# Patient Record
Sex: MALE | Race: BLACK OR AFRICAN AMERICAN | NOT HISPANIC OR LATINO | Employment: FULL TIME | URBAN - METROPOLITAN AREA
[De-identification: names, ages, dates, MRNs, and addresses within clinical notes are randomized per-mention and may not be internally consistent; named-entity substitution may affect disease eponyms.]

---

## 2018-02-01 ENCOUNTER — HISTORICAL (OUTPATIENT)
Dept: CARDIOLOGY | Facility: HOSPITAL | Age: 48
End: 2018-02-01

## 2018-04-19 ENCOUNTER — HISTORICAL (OUTPATIENT)
Dept: ADMINISTRATIVE | Facility: HOSPITAL | Age: 48
End: 2018-04-19

## 2018-04-19 LAB
APPEARANCE, UA: CLEAR
BACTERIA #/AREA URNS AUTO: NORMAL /HPF
BILIRUB UR QL STRIP: NEGATIVE MG/DL
CHOLEST SERPL-MCNC: 166 MG/DL (ref 0–200)
CHOLEST/HDLC SERPL: 2.7 {RATIO}
COLOR UR: YELLOW
GLUCOSE (UA): NEGATIVE MG/DL
HDLC SERPL-MCNC: 62 MG/DL (ref 35–60)
HGB UR QL STRIP: NEGATIVE UNIT/L
KETONES UR QL STRIP: NEGATIVE MG/DL
LDLC SERPL CALC-MCNC: 69 MG/DL (ref 0–129)
LEUKOCYTE ESTERASE UR QL STRIP: NEGATIVE UNIT/L
NITRITE UR QL STRIP.AUTO: NEGATIVE
PH UR STRIP: 7 [PH]
PROT UR QL STRIP: NEGATIVE MG/DL
RBC #/AREA URNS HPF: NORMAL /HPF
SP GR UR STRIP: 1.01
SQUAMOUS EPITHELIAL, UA: NORMAL /LPF
TRIGL SERPL-MCNC: 90 MG/DL (ref 30–150)
UROBILINOGEN UR STRIP-ACNC: 0.2 MG/DL
VLDLC SERPL CALC-MCNC: 18 MG/DL
WBC #/AREA URNS AUTO: NORMAL /[HPF]

## 2018-10-04 ENCOUNTER — HISTORICAL (OUTPATIENT)
Dept: RADIOLOGY | Facility: HOSPITAL | Age: 48
End: 2018-10-04

## 2018-10-04 LAB — POC CREATININE: 1.2 MG/DL (ref 0.6–1.3)

## 2018-10-10 ENCOUNTER — HISTORICAL (OUTPATIENT)
Dept: URGENT CARE | Facility: CLINIC | Age: 48
End: 2018-10-10

## 2018-10-10 LAB
ABS NEUT (OLG): 2.35 X10(3)/MCL (ref 2.1–9.2)
ALBUMIN SERPL-MCNC: 4.3 GM/DL (ref 3.4–5)
ALBUMIN/GLOB SERPL: 1.3 {RATIO}
ALP SERPL-CCNC: 63 UNIT/L (ref 50–136)
ALT SERPL-CCNC: 33 UNIT/L (ref 12–78)
AST SERPL-CCNC: 31 UNIT/L (ref 15–37)
BASOPHILS # BLD AUTO: 0.1 X10(3)/MCL (ref 0–0.2)
BASOPHILS NFR BLD AUTO: 1 %
BILIRUB SERPL-MCNC: 0.6 MG/DL (ref 0.2–1)
BILIRUBIN DIRECT+TOT PNL SERPL-MCNC: 0.2 MG/DL (ref 0–0.2)
BILIRUBIN DIRECT+TOT PNL SERPL-MCNC: 0.4 MG/DL (ref 0–0.8)
BUN SERPL-MCNC: 13 MG/DL (ref 7–18)
CALCIUM SERPL-MCNC: 9.4 MG/DL (ref 8.5–10.1)
CHLORIDE SERPL-SCNC: 107 MMOL/L (ref 98–107)
CO2 SERPL-SCNC: 28 MMOL/L (ref 21–32)
CREAT SERPL-MCNC: 1.11 MG/DL (ref 0.7–1.3)
EOSINOPHIL # BLD AUTO: 0.2 X10(3)/MCL (ref 0–0.9)
EOSINOPHIL NFR BLD AUTO: 3 %
ERYTHROCYTE [DISTWIDTH] IN BLOOD BY AUTOMATED COUNT: 13.1 % (ref 11.5–17)
GLOBULIN SER-MCNC: 3.2 GM/DL (ref 2.4–3.5)
GLUCOSE SERPL-MCNC: 86 MG/DL (ref 74–106)
HCT VFR BLD AUTO: 40 % (ref 42–52)
HGB BLD-MCNC: 13.4 GM/DL (ref 14–18)
LYMPHOCYTES # BLD AUTO: 3.8 X10(3)/MCL (ref 0.6–4.6)
LYMPHOCYTES NFR BLD AUTO: 52 %
MCH RBC QN AUTO: 31 PG (ref 27–31)
MCHC RBC AUTO-ENTMCNC: 33.5 GM/DL (ref 33–36)
MCV RBC AUTO: 92.6 FL (ref 80–94)
MONOCYTES # BLD AUTO: 0.9 X10(3)/MCL (ref 0.1–1.3)
MONOCYTES NFR BLD AUTO: 12 %
NEUTROPHILS # BLD AUTO: 2.35 X10(3)/MCL (ref 2.1–9.2)
NEUTROPHILS NFR BLD AUTO: 32 %
PLATELET # BLD AUTO: 245 X10(3)/MCL (ref 130–400)
PMV BLD AUTO: 10.6 FL (ref 9.4–12.4)
POTASSIUM SERPL-SCNC: 4.4 MMOL/L (ref 3.5–5.1)
PROT SERPL-MCNC: 7.5 GM/DL (ref 6.4–8.2)
RBC # BLD AUTO: 4.32 X10(6)/MCL (ref 4.7–6.1)
SODIUM SERPL-SCNC: 143 MMOL/L (ref 136–145)
WBC # SPEC AUTO: 7.4 X10(3)/MCL (ref 4.5–11.5)

## 2019-02-18 ENCOUNTER — HISTORICAL (OUTPATIENT)
Dept: ADMINISTRATIVE | Facility: HOSPITAL | Age: 49
End: 2019-02-18

## 2019-02-18 LAB
ABS NEUT (OLG): 2 X10(3)/MCL (ref 2.1–9.2)
ALBUMIN SERPL-MCNC: 4.4 GM/DL (ref 3.4–5)
ALBUMIN/GLOB SERPL: 1.83 {RATIO} (ref 1.5–2.5)
ALP SERPL-CCNC: 52 UNIT/L (ref 38–126)
ALT SERPL-CCNC: 22 UNIT/L (ref 7–52)
APPEARANCE, UA: CLEAR
AST SERPL-CCNC: 25 UNIT/L (ref 15–37)
BACTERIA #/AREA URNS AUTO: NORMAL /HPF
BILIRUB SERPL-MCNC: 0.6 MG/DL (ref 0.2–1)
BILIRUB UR QL STRIP: NEGATIVE MG/DL
BILIRUBIN DIRECT+TOT PNL SERPL-MCNC: 0.1 MG/DL (ref 0–0.5)
BILIRUBIN DIRECT+TOT PNL SERPL-MCNC: 0.5 MG/DL
BUN SERPL-MCNC: 13 MG/DL (ref 7–18)
CALCIUM SERPL-MCNC: 9.4 MG/DL (ref 8.5–10)
CHLORIDE SERPL-SCNC: 101 MMOL/L (ref 98–107)
CHOLEST SERPL-MCNC: 249 MG/DL (ref 0–200)
CHOLEST/HDLC SERPL: 4.6 {RATIO}
CO2 SERPL-SCNC: 27 MMOL/L (ref 21–32)
COLOR UR: YELLOW
CREAT SERPL-MCNC: 1.17 MG/DL (ref 0.6–1.3)
ERYTHROCYTE [DISTWIDTH] IN BLOOD BY AUTOMATED COUNT: 13.4 % (ref 11.5–17)
GLOBULIN SER-MCNC: 2.4 GM/DL (ref 1.2–3)
GLUCOSE (UA): NEGATIVE MG/DL
GLUCOSE SERPL-MCNC: 92 MG/DL (ref 74–106)
HCT VFR BLD AUTO: 43 % (ref 42–52)
HDLC SERPL-MCNC: 54 MG/DL (ref 35–60)
HGB BLD-MCNC: 13.7 GM/DL (ref 14–18)
HGB UR QL STRIP: NEGATIVE UNIT/L
KETONES UR QL STRIP: NEGATIVE MG/DL
LDLC SERPL CALC-MCNC: 127 MG/DL (ref 0–129)
LEUKOCYTE ESTERASE UR QL STRIP: NEGATIVE UNIT/L
LYMPHOCYTES # BLD AUTO: 4.2 X10(3)/MCL (ref 0.6–3.4)
LYMPHOCYTES NFR BLD AUTO: 58.7 % (ref 13–40)
MCH RBC QN AUTO: 30.7 PG (ref 27–31.2)
MCHC RBC AUTO-ENTMCNC: 32 GM/DL (ref 32–36)
MCV RBC AUTO: 96 FL (ref 80–94)
MONOCYTES # BLD AUTO: 1 X10(3)/MCL (ref 0.1–1.3)
MONOCYTES NFR BLD AUTO: 14.3 % (ref 0.1–24)
NEUTROPHILS NFR BLD AUTO: 27 % (ref 47–80)
NITRITE UR QL STRIP.AUTO: NEGATIVE
PH UR STRIP: 7 [PH]
PLATELET # BLD AUTO: 236 X10(3)/MCL (ref 130–400)
PMV BLD AUTO: 9.5 FL (ref 9.4–12.4)
POTASSIUM SERPL-SCNC: 4.5 MMOL/L (ref 3.5–5.1)
PROT SERPL-MCNC: 6.8 GM/DL (ref 6.4–8.2)
PROT UR QL STRIP: NEGATIVE MG/DL
PSA SERPL-MCNC: 0.66 NG/ML (ref 0–2.5)
RBC # BLD AUTO: 4.46 X10(6)/MCL (ref 4.7–6.1)
RBC #/AREA URNS HPF: NORMAL /HPF
SODIUM SERPL-SCNC: 137 MMOL/L (ref 136–145)
SP GR UR STRIP: 1.01
SQUAMOUS EPITHELIAL, UA: NORMAL /LPF
TRIGL SERPL-MCNC: 169 MG/DL (ref 30–150)
UROBILINOGEN UR STRIP-ACNC: 0.2 MG/DL
VLDLC SERPL CALC-MCNC: 33.8 MG/DL
WBC # SPEC AUTO: 7.2 X10(3)/MCL (ref 4.5–11.5)
WBC #/AREA URNS AUTO: NORMAL /[HPF]

## 2019-03-21 ENCOUNTER — HISTORICAL (OUTPATIENT)
Dept: RADIOLOGY | Facility: HOSPITAL | Age: 49
End: 2019-03-21

## 2019-12-03 ENCOUNTER — HISTORICAL (OUTPATIENT)
Dept: ADMINISTRATIVE | Facility: HOSPITAL | Age: 49
End: 2019-12-03

## 2019-12-03 LAB
ABS NEUT (OLG): 2.68 X10(3)/MCL (ref 2.1–9.2)
EOSINOPHIL NFR BLD MANUAL: 8 % (ref 0–8)
ERYTHROCYTE [DISTWIDTH] IN BLOOD BY AUTOMATED COUNT: 13.9 % (ref 11.5–17)
HCT VFR BLD AUTO: 39.7 % (ref 42–52)
HGB BLD-MCNC: 12.8 GM/DL (ref 14–18)
LYMPHOCYTES NFR BLD MANUAL: 38 % (ref 13–40)
MCH RBC QN AUTO: 30.2 PG (ref 27–31)
MCHC RBC AUTO-ENTMCNC: 32.2 GM/DL (ref 33–36)
MCV RBC AUTO: 93.6 FL (ref 80–94)
MONOCYTES NFR BLD MANUAL: 11 % (ref 2–11)
NEUTROPHILS NFR BLD MANUAL: 43 % (ref 47–80)
PLATELET # BLD AUTO: 219 X10(3)/MCL (ref 130–400)
PLATELET # BLD EST: NORMAL 10*3/UL
PMV BLD AUTO: 10.5 FL (ref 9.4–12.4)
RBC # BLD AUTO: 4.24 X10(6)/MCL (ref 4.7–6.1)
WBC # SPEC AUTO: 5.8 X10(3)/MCL (ref 4.5–11.5)

## 2019-12-16 LAB — CRC RECOMMENDATION EXT: NORMAL

## 2020-03-11 ENCOUNTER — HISTORICAL (OUTPATIENT)
Dept: ADMINISTRATIVE | Facility: HOSPITAL | Age: 50
End: 2020-03-11

## 2020-03-11 LAB
ABS NEUT (OLG): 1.4 X10(3)/MCL (ref 2.1–9.2)
ALBUMIN SERPL-MCNC: 4.7 GM/DL (ref 3.4–5)
ALBUMIN/GLOB SERPL: 1.88 {RATIO} (ref 1.5–2.5)
ALP SERPL-CCNC: 59 UNIT/L (ref 38–126)
ALT SERPL-CCNC: 27 UNIT/L (ref 7–52)
APPEARANCE, UA: CLEAR
AST SERPL-CCNC: 25 UNIT/L (ref 15–37)
BACTERIA #/AREA URNS AUTO: NORMAL /HPF
BILIRUB SERPL-MCNC: 0.5 MG/DL (ref 0.2–1)
BILIRUB UR QL STRIP: NEGATIVE MG/DL
BILIRUBIN DIRECT+TOT PNL SERPL-MCNC: 0.1 MG/DL (ref 0–0.5)
BILIRUBIN DIRECT+TOT PNL SERPL-MCNC: 0.4 MG/DL
BUN SERPL-MCNC: 17 MG/DL (ref 7–18)
CALCIUM SERPL-MCNC: 9.7 MG/DL (ref 8.5–10)
CHLORIDE SERPL-SCNC: 101 MMOL/L (ref 98–107)
CHOLEST SERPL-MCNC: 219 MG/DL (ref 0–200)
CHOLEST/HDLC SERPL: 3.9 {RATIO}
CO2 SERPL-SCNC: 32 MMOL/L (ref 21–32)
COLOR UR: YELLOW
CREAT SERPL-MCNC: 1.12 MG/DL (ref 0.6–1.3)
ERYTHROCYTE [DISTWIDTH] IN BLOOD BY AUTOMATED COUNT: 12.9 % (ref 11.5–17)
GLOBULIN SER-MCNC: 2.5 GM/DL (ref 1.2–3)
GLUCOSE (UA): NEGATIVE MG/DL
GLUCOSE SERPL-MCNC: 99 MG/DL (ref 74–106)
HCT VFR BLD AUTO: 43.1 % (ref 42–52)
HDLC SERPL-MCNC: 56 MG/DL (ref 35–60)
HGB BLD-MCNC: 14.2 GM/DL (ref 14–18)
HGB UR QL STRIP: NEGATIVE UNIT/L
KETONES UR QL STRIP: NEGATIVE MG/DL
LDLC SERPL CALC-MCNC: 141 MG/DL (ref 0–129)
LEUKOCYTE ESTERASE UR QL STRIP: NEGATIVE UNIT/L
LYMPHOCYTES # BLD AUTO: 3.7 X10(3)/MCL (ref 0.6–3.4)
LYMPHOCYTES NFR BLD AUTO: 60 % (ref 13–40)
MCH RBC QN AUTO: 30.2 PG (ref 27–31.2)
MCHC RBC AUTO-ENTMCNC: 33 GM/DL (ref 32–36)
MCV RBC AUTO: 92 FL (ref 80–94)
MONOCYTES # BLD AUTO: 1.1 X10(3)/MCL (ref 0.1–1.3)
MONOCYTES NFR BLD AUTO: 17.6 % (ref 0.1–24)
NEUTROPHILS NFR BLD AUTO: 22.4 % (ref 47–80)
NITRITE UR QL STRIP.AUTO: NEGATIVE
PH UR STRIP: 7 [PH]
PLATELET # BLD AUTO: 283 X10(3)/MCL (ref 130–400)
PMV BLD AUTO: 9.8 FL (ref 9.4–12.4)
POTASSIUM SERPL-SCNC: 4.3 MMOL/L (ref 3.5–5.1)
PROT SERPL-MCNC: 7.2 GM/DL (ref 6.4–8.2)
PROT UR QL STRIP: NEGATIVE MG/DL
PSA SERPL-MCNC: 0.65 NG/ML (ref 0–2.5)
RBC # BLD AUTO: 4.7 X10(6)/MCL (ref 4.7–6.1)
RBC #/AREA URNS HPF: NORMAL /HPF
SODIUM SERPL-SCNC: 138 MMOL/L (ref 136–145)
SP GR UR STRIP: 1.02
SQUAMOUS EPITHELIAL, UA: NORMAL /LPF
TRIGL SERPL-MCNC: 71 MG/DL (ref 30–150)
UROBILINOGEN UR STRIP-ACNC: 0.2 MG/DL
VLDLC SERPL CALC-MCNC: 14.2 MG/DL
WBC # SPEC AUTO: 6.2 X10(3)/MCL (ref 4.5–11.5)
WBC #/AREA URNS AUTO: NORMAL /[HPF]

## 2020-06-04 ENCOUNTER — HISTORICAL (OUTPATIENT)
Dept: ADMINISTRATIVE | Facility: HOSPITAL | Age: 50
End: 2020-06-04

## 2021-02-10 ENCOUNTER — HISTORICAL (OUTPATIENT)
Dept: LAB | Facility: HOSPITAL | Age: 51
End: 2021-02-10

## 2021-02-10 LAB
ABS NEUT (OLG): 2.4 X10(3)/MCL (ref 2.1–9.2)
ALBUMIN SERPL-MCNC: 4.2 GM/DL (ref 3.5–5)
ALBUMIN/GLOB SERPL: 1.4 RATIO (ref 1.1–2)
ALP SERPL-CCNC: 58 UNIT/L (ref 40–150)
ALT SERPL-CCNC: 22 UNIT/L (ref 0–55)
APPEARANCE, UA: CLEAR
AST SERPL-CCNC: 22 UNIT/L (ref 5–34)
BASOPHILS # BLD AUTO: 0.09 X10(3)/MCL (ref 0–0.2)
BASOPHILS NFR BLD AUTO: 1.5 % (ref 0–0.9)
BILIRUB SERPL-MCNC: 0.8 MG/DL (ref 0.2–1.2)
BILIRUB UR QL STRIP: NEGATIVE
BILIRUBIN DIRECT+TOT PNL SERPL-MCNC: 0.2 MG/DL (ref 0–0.5)
BILIRUBIN DIRECT+TOT PNL SERPL-MCNC: 0.6 MG/DL (ref 0–0.8)
BUN SERPL-MCNC: 16.5 MG/DL (ref 8.4–25.7)
CALCIUM SERPL-MCNC: 9.6 MG/DL (ref 8.4–10.2)
CHLORIDE SERPL-SCNC: 103 MMOL/L (ref 98–107)
CHOLEST SERPL-MCNC: 229 MG/DL
CHOLEST/HDLC SERPL: 5 {RATIO} (ref 0–5)
CO2 SERPL-SCNC: 30 MMOL/L (ref 22–29)
COLOR UR: YELLOW
CREAT SERPL-MCNC: 1.16 MG/DL (ref 0.72–1.25)
DEPRECATED CALCIDIOL+CALCIFEROL SERPL-MC: 61.4 NG/ML (ref 30–80)
EOSINOPHIL # BLD AUTO: 0.32 X10(3)/MCL (ref 0–0.9)
EOSINOPHIL NFR BLD AUTO: 5.2 % (ref 0–6.5)
ERYTHROCYTE [DISTWIDTH] IN BLOOD BY AUTOMATED COUNT: 13.3 % (ref 11.5–17)
EST. AVERAGE GLUCOSE BLD GHB EST-MCNC: 114 MG/DL
GLOBULIN SER-MCNC: 3 GM/DL (ref 2.4–3.5)
GLUCOSE (UA): NEGATIVE
GLUCOSE SERPL-MCNC: 96 MG/DL (ref 74–100)
HBA1C MFR BLD: 5.6 %
HCT VFR BLD AUTO: 39.6 % (ref 42–52)
HDLC SERPL-MCNC: 46 MG/DL (ref 40–60)
HGB BLD-MCNC: 13 GM/DL (ref 14–18)
HGB UR QL STRIP: NEGATIVE
IMM GRANULOCYTES # BLD AUTO: 0 10*3/UL (ref 0–0.02)
IMM GRANULOCYTES NFR BLD AUTO: 0 % (ref 0–0.43)
KETONES UR QL STRIP: NEGATIVE
LDLC SERPL CALC-MCNC: 151 MG/DL (ref 50–140)
LEUKOCYTE ESTERASE UR QL STRIP: NEGATIVE
LYMPHOCYTES # BLD AUTO: 2.62 X10(3)/MCL (ref 0.6–4.6)
LYMPHOCYTES NFR BLD AUTO: 42.3 % (ref 16.2–38.3)
MCH RBC QN AUTO: 29.8 PG (ref 27–31)
MCHC RBC AUTO-ENTMCNC: 32.8 GM/DL (ref 33–36)
MCV RBC AUTO: 90.8 FL (ref 80–94)
MONOCYTES # BLD AUTO: 0.76 X10(3)/MCL (ref 0.1–1.3)
MONOCYTES NFR BLD AUTO: 12.3 % (ref 4.7–11.3)
NEUTROPHILS # BLD AUTO: 2.4 X10(3)/MCL (ref 2.1–9.2)
NEUTROPHILS NFR BLD AUTO: 38.7 % (ref 49.1–73.4)
NITRITE UR QL STRIP: NEGATIVE
NRBC BLD AUTO-RTO: 0 % (ref 0–0.2)
PH UR STRIP: 6.5 [PH] (ref 5–7)
PLATELET # BLD AUTO: 231 X10(3)/MCL (ref 130–400)
PMV BLD AUTO: 10.5 FL (ref 7.4–10.4)
POTASSIUM SERPL-SCNC: 3.9 MMOL/L (ref 3.5–5.1)
PROT SERPL-MCNC: 7.2 GM/DL (ref 6.4–8.3)
PROT UR QL STRIP: NEGATIVE
PSA SERPL-MCNC: 0.76 NG/ML
RBC # BLD AUTO: 4.36 X10(6)/MCL (ref 4.7–6.1)
SODIUM SERPL-SCNC: 139 MMOL/L (ref 136–145)
SP GR UR STRIP: 1.01 (ref 1–1.03)
TRIGL SERPL-MCNC: 161 MG/DL (ref 0–150)
UROBILINOGEN UR STRIP-ACNC: NEGATIVE
VLDLC SERPL CALC-MCNC: 32 MG/DL
WBC # SPEC AUTO: 6.2 X10(3)/MCL (ref 4.5–11.5)

## 2021-03-31 ENCOUNTER — HISTORICAL (OUTPATIENT)
Dept: ADMINISTRATIVE | Facility: HOSPITAL | Age: 51
End: 2021-03-31

## 2021-03-31 LAB
APPEARANCE, UA: CLEAR
BACTERIA SPEC CULT: NORMAL /HPF
BILIRUB UR QL STRIP: NEGATIVE
COLOR UR: YELLOW
GLUCOSE (UA): NEGATIVE
HGB UR QL STRIP: NEGATIVE
KETONES UR QL STRIP: NEGATIVE
LEUKOCYTE ESTERASE UR QL STRIP: NEGATIVE
NITRITE UR QL STRIP: NEGATIVE
PH UR STRIP: 7.5 [PH] (ref 5–9)
PROT UR QL STRIP: NEGATIVE
RBC #/AREA URNS HPF: NORMAL /[HPF]
SP GR UR STRIP: 1.01 (ref 1–1.03)
SQUAMOUS EPITHELIAL, UA: NORMAL /HPF (ref 0–4)
UROBILINOGEN UR STRIP-ACNC: 0.2
WBC #/AREA URNS HPF: NORMAL /HPF

## 2021-08-09 ENCOUNTER — HISTORICAL (OUTPATIENT)
Dept: LAB | Facility: HOSPITAL | Age: 51
End: 2021-08-09

## 2021-08-09 LAB
ALBUMIN SERPL-MCNC: 4.2 GM/DL (ref 3.5–5)
ALBUMIN/GLOB SERPL: 1.3 RATIO (ref 1.1–2)
ALP SERPL-CCNC: 64 UNIT/L (ref 40–150)
ALT SERPL-CCNC: 28 UNIT/L (ref 0–55)
AST SERPL-CCNC: 28 UNIT/L (ref 5–34)
BILIRUB SERPL-MCNC: 0.7 MG/DL (ref 0.2–1.2)
BILIRUBIN DIRECT+TOT PNL SERPL-MCNC: 0.2 MG/DL (ref 0–0.5)
BILIRUBIN DIRECT+TOT PNL SERPL-MCNC: 0.5 MG/DL (ref 0–0.8)
BUN SERPL-MCNC: 18 MG/DL (ref 8.4–25.7)
CALCIUM SERPL-MCNC: 9.8 MG/DL (ref 8.4–10.2)
CHLORIDE SERPL-SCNC: 103 MMOL/L (ref 98–107)
CHOLEST SERPL-MCNC: 182 MG/DL
CHOLEST/HDLC SERPL: 3 {RATIO} (ref 0–5)
CO2 SERPL-SCNC: 28 MMOL/L (ref 22–29)
CREAT SERPL-MCNC: 1.21 MG/DL (ref 0.72–1.25)
GLOBULIN SER-MCNC: 3.3 GM/DL (ref 2.4–3.5)
GLUCOSE SERPL-MCNC: 94 MG/DL (ref 74–100)
HDLC SERPL-MCNC: 54 MG/DL (ref 40–60)
LDLC SERPL CALC-MCNC: 109 MG/DL (ref 50–140)
POTASSIUM SERPL-SCNC: 4.4 MMOL/L (ref 3.5–5.1)
PROT SERPL-MCNC: 7.5 GM/DL (ref 6.4–8.3)
SODIUM SERPL-SCNC: 139 MMOL/L (ref 136–145)
T4 FREE SERPL-MCNC: 0.76 NG/DL (ref 0.7–1.48)
TRIGL SERPL-MCNC: 96 MG/DL (ref 0–150)
TSH SERPL-ACNC: 2.25 UIU/ML (ref 0.35–4.94)
VLDLC SERPL CALC-MCNC: 19 MG/DL

## 2022-02-15 ENCOUNTER — HISTORICAL (OUTPATIENT)
Dept: LAB | Facility: HOSPITAL | Age: 52
End: 2022-02-15

## 2022-02-15 LAB
ABS NEUT (OLG): 1.14 (ref 2.1–9.2)
ALBUMIN SERPL-MCNC: 4.2 G/DL (ref 3.5–5)
ALBUMIN/GLOB SERPL: 1.6 {RATIO} (ref 1.1–2)
ALP SERPL-CCNC: 68 U/L (ref 40–150)
ALT SERPL-CCNC: 26 U/L (ref 0–55)
APPEARANCE, UA: CLEAR
AST SERPL-CCNC: 29 U/L (ref 5–34)
BASOPHILS NFR BLD MANUAL: 0 % (ref 0–2)
BILIRUB SERPL-MCNC: 0.8 MG/DL (ref 0.2–1.2)
BILIRUB UR QL STRIP.AUTO: NEGATIVE
BILIRUB UR QL STRIP: NEGATIVE
BILIRUBIN DIRECT+TOT PNL SERPL-MCNC: 0.3 (ref 0–0.5)
BILIRUBIN DIRECT+TOT PNL SERPL-MCNC: 0.5 (ref 0–0.8)
BUN SERPL-MCNC: 11.6 MG/DL (ref 8.4–25.7)
CALCIUM SERPL-MCNC: 9.7 MG/DL (ref 8.4–10.2)
CHLORIDE SERPL-SCNC: 105 MMOL/L (ref 98–107)
CHOLEST SERPL-MCNC: 170 MG/DL
CHOLEST/HDLC SERPL: 3 {RATIO} (ref 0–5)
CO2 SERPL-SCNC: 29 MMOL/L (ref 22–29)
COLOR UR: NORMAL
CREAT SERPL-MCNC: 1.1 MG/DL (ref 0.72–1.25)
DEPRECATED CALCIDIOL+CALCIFEROL SERPL-MC: 41.7 NG/ML (ref 30–80)
DO MICRO?: NO
EOSINOPHIL NFR BLD MANUAL: 5 % (ref 0–8)
ERYTHROCYTE [DISTWIDTH] IN BLOOD BY AUTOMATED COUNT: 14.2 % (ref 11.5–17)
GLOBULIN SER-MCNC: 2.6 G/DL (ref 2.4–3.5)
GLUCOSE (UA): NEGATIVE
GLUCOSE SERPL-MCNC: 95 MG/DL (ref 74–100)
GLUCOSE UR QL STRIP.AUTO: NEGATIVE
GRANULOCYTES NFR BLD MANUAL: 17 % (ref 47–80)
HCT VFR BLD AUTO: 39 % (ref 42–52)
HDLC SERPL-MCNC: 56 MG/DL (ref 40–60)
HEMOLYSIS INTERF INDEX SERPL-ACNC: 12
HGB BLD-MCNC: 12.8 G/DL (ref 14–18)
HGB UR QL STRIP: NEGATIVE
HYPOCHROMIA BLD QL SMEAR: NORMAL
ICTERIC INTERF INDEX SERPL-ACNC: 1
KETONES UR QL STRIP.AUTO: NEGATIVE
KETONES UR QL STRIP: NEGATIVE
LDLC SERPL CALC-MCNC: 94 MG/DL (ref 50–140)
LEUKOCYTE ESTERASE UR QL STRIP.AUTO: NEGATIVE
LEUKOCYTE ESTERASE UR QL STRIP: NEGATIVE
LIPEMIC INTERF INDEX SERPL-ACNC: 4
LYMPHOCYTES NFR BLD MANUAL: 61 % (ref 13–40)
MANUAL DIFF? (OHS): YES
MCH RBC QN AUTO: 30.6 PG (ref 27–31)
MCHC RBC AUTO-ENTMCNC: 32.8 G/DL (ref 33–36)
MCV RBC AUTO: 93.3 FL (ref 80–94)
MONOCYTES NFR BLD MANUAL: 17 % (ref 2–11)
NITRITE UR QL STRIP: NEGATIVE
PH UR STRIP: 6.5 [PH] (ref 5–7)
PLATELET # BLD AUTO: 233 10*3/UL (ref 130–400)
PLATELET # BLD EST: ADEQUATE 10*3/UL
PMV BLD AUTO: 10.3 FL (ref 7.4–10.4)
POTASSIUM SERPL-SCNC: 4.9 MMOL/L (ref 3.5–5.1)
PROT SERPL-MCNC: 6.8 G/DL (ref 6.4–8.3)
PROT UR QL STRIP.AUTO: NEGATIVE
PROT UR QL STRIP: NEGATIVE
PSA SERPL-MCNC: 0.8 NG/ML
RBC # BLD AUTO: 4.18 10*6/UL (ref 4.7–6.1)
RBC MORPH BLD: NORMAL
RBC UR QL AUTO: NEGATIVE
SODIUM SERPL-SCNC: 142 MMOL/L (ref 136–145)
SP GR UR STRIP: 1.01 (ref 1–1.03)
TRIGL SERPL-MCNC: 101 MG/DL (ref 0–150)
UROBILINOGEN UR STRIP-ACNC: 0.2
UROBILINOGEN UR STRIP-ACNC: NEGATIVE
VLDLC SERPL CALC-MCNC: 20 MG/DL
WBC # SPEC AUTO: 5 10*3/UL (ref 4.5–11.5)

## 2022-04-18 ENCOUNTER — HISTORICAL (OUTPATIENT)
Dept: ADMINISTRATIVE | Facility: HOSPITAL | Age: 52
End: 2022-04-18
Payer: COMMERCIAL

## 2022-04-18 LAB
FERRITIN SERPL-MCNC: 49.23 NG/ML (ref 21.81–274.66)
FOLATE SERPL-MCNC: 12.2 NG/ML (ref 7–31.4)
IRON SATN MFR SERPL: 33 % (ref 20–50)
IRON SERPL-MCNC: 113 UG/DL (ref 65–175)
TIBC SERPL-MCNC: 226 UG/DL (ref 69–240)
TIBC SERPL-MCNC: 339 UG/DL (ref 250–450)
TRANSFERRIN SERPL-MCNC: 321 MG/DL (ref 174–364)
VIT B12 SERPL-MCNC: 932 PG/ML (ref 213–816)

## 2022-09-09 ENCOUNTER — OFFICE VISIT (OUTPATIENT)
Dept: FAMILY MEDICINE | Facility: CLINIC | Age: 52
End: 2022-09-09
Payer: COMMERCIAL

## 2022-09-09 VITALS
OXYGEN SATURATION: 99 % | HEIGHT: 71 IN | HEART RATE: 64 BPM | RESPIRATION RATE: 18 BRPM | WEIGHT: 192.69 LBS | TEMPERATURE: 99 F | DIASTOLIC BLOOD PRESSURE: 83 MMHG | SYSTOLIC BLOOD PRESSURE: 129 MMHG | BODY MASS INDEX: 26.98 KG/M2

## 2022-09-09 DIAGNOSIS — E78.5 HYPERLIPIDEMIA, UNSPECIFIED HYPERLIPIDEMIA TYPE: ICD-10-CM

## 2022-09-09 DIAGNOSIS — T46.6X5A MYALGIA DUE TO STATIN: ICD-10-CM

## 2022-09-09 DIAGNOSIS — I10 HYPERTENSION, UNSPECIFIED TYPE: Primary | ICD-10-CM

## 2022-09-09 DIAGNOSIS — M79.10 MYALGIA DUE TO STATIN: ICD-10-CM

## 2022-09-09 PROBLEM — E55.9 VITAMIN D DEFICIENCY: Status: ACTIVE | Noted: 2022-09-09

## 2022-09-09 PROBLEM — Z86.73 HISTORY OF TRANSIENT ISCHEMIC ATTACK: Status: ACTIVE | Noted: 2022-09-09

## 2022-09-09 PROBLEM — N52.9 ERECTILE DYSFUNCTION: Status: ACTIVE | Noted: 2022-09-09

## 2022-09-09 PROCEDURE — 3074F PR MOST RECENT SYSTOLIC BLOOD PRESSURE < 130 MM HG: ICD-10-PCS | Mod: CPTII,,, | Performed by: INTERNAL MEDICINE

## 2022-09-09 PROCEDURE — 99203 PR OFFICE/OUTPT VISIT, NEW, LEVL III, 30-44 MIN: ICD-10-PCS | Mod: ,,, | Performed by: INTERNAL MEDICINE

## 2022-09-09 PROCEDURE — 3074F SYST BP LT 130 MM HG: CPT | Mod: CPTII,,, | Performed by: INTERNAL MEDICINE

## 2022-09-09 PROCEDURE — 1159F MED LIST DOCD IN RCRD: CPT | Mod: CPTII,,, | Performed by: INTERNAL MEDICINE

## 2022-09-09 PROCEDURE — 1159F PR MEDICATION LIST DOCUMENTED IN MEDICAL RECORD: ICD-10-PCS | Mod: CPTII,,, | Performed by: INTERNAL MEDICINE

## 2022-09-09 PROCEDURE — 3008F PR BODY MASS INDEX (BMI) DOCUMENTED: ICD-10-PCS | Mod: CPTII,,, | Performed by: INTERNAL MEDICINE

## 2022-09-09 PROCEDURE — 3079F PR MOST RECENT DIASTOLIC BLOOD PRESSURE 80-89 MM HG: ICD-10-PCS | Mod: CPTII,,, | Performed by: INTERNAL MEDICINE

## 2022-09-09 PROCEDURE — 99203 OFFICE O/P NEW LOW 30 MIN: CPT | Mod: ,,, | Performed by: INTERNAL MEDICINE

## 2022-09-09 PROCEDURE — 3008F BODY MASS INDEX DOCD: CPT | Mod: CPTII,,, | Performed by: INTERNAL MEDICINE

## 2022-09-09 PROCEDURE — 3079F DIAST BP 80-89 MM HG: CPT | Mod: CPTII,,, | Performed by: INTERNAL MEDICINE

## 2022-09-09 RX ORDER — EZETIMIBE 10 MG/1
10 TABLET ORAL DAILY
COMMUNITY
Start: 2022-08-09 | End: 2023-02-16

## 2022-09-09 NOTE — PROGRESS NOTES
Subjective:      Patient ID: Jeffrey Regan is a 52 y.o. male.    Chief Complaint: Follow-up    HPI  Last wellness 02/15/2022  This is his 6 month f/u visit  Stable, no acute issues  Doing intermittent fasting- doing well so far     HTN:  current med: amlodipine 5 mg daily  BP controlled    HLD:  current med: Zetia 10 mg daily; did not tolerate statin secondary to myalgias  does supplement with: MV; Omega 3 capsule, Zinc, Vitamin D/C, aspirin    History of TIA:  Patient reports that in 2017 and  he had two flare ups, one of neck pain along with numbness to R hand and second with R neck pain. Went to ED in 2017, was told possible TIA, work up including brain imaging, carotid artery US, calcium score, holter monitor reportedly normal. Then during second attack with his neck and chest pain, went to cardiology, Dr. Royal and angiogram performed reportedly normal as well. Was given medication during that time for HTN but was not told of HTN issues before. He says that he was tried on statin or zetia but did not tolerate side effects. He reports still having angina but can't tell if it's just a neck spasm to L neck now that he gets.    Vitamin D Deficiency; Takes Vitamin D3 daily. Unsure of dosage  ED: intermittent use of Viagra    Past Surgery History:  none      Family History:  Mom: DM, HTN,  in  CVA  Dad: DM with complications, PVD, emphysema,    Siblings: 8 in total  Sister: DM coma   Brother: Liver failure   Brother: heart condition  Brother: prostate cancer, lung cancer    COVID 19 vaccine: x 3 doses completed  Colonoscopy in 2019 with Dr. Carrillo  repeat due Dec 2024  Health Maintenance Due   Topic Date Due    Hepatitis C Screening  Never done    HIV Screening  Never done    TETANUS VACCINE  Never done    Colorectal Cancer Screening  Never done    Shingles Vaccine (1 of 2) Never done    COVID-19 Vaccine (4 - Booster for Moderna series) 2022    Influenza Vaccine (1)  "09/01/2022     Review of Systems   Constitutional:  Negative for chills, fatigue and fever.   HENT:  Negative for congestion, ear pain, postnasal drip, rhinorrhea, sinus pressure and sore throat.    Eyes:  Negative for itching and visual disturbance.   Respiratory:  Negative for cough, shortness of breath and wheezing.    Cardiovascular:  Negative for chest pain, palpitations and leg swelling.   Gastrointestinal:  Negative for abdominal pain and nausea.   Genitourinary:  Negative for dysuria.   Musculoskeletal:  Negative for arthralgias and myalgias.   Skin:  Negative for rash.   Neurological:  Negative for weakness, light-headedness and headaches.   A comprehensive ROS was performed and is negative except as noted above.  Objective:     Vitals:    09/09/22 1113   BP: 129/83   BP Location: Left arm   Patient Position: Sitting   BP Method: Large (Automatic)   Pulse: 64   Resp: 18   Temp: 98.8 °F (37.1 °C)   TempSrc: Oral   SpO2: 99%   Weight: 87.4 kg (192 lb 11.2 oz)   Height: 5' 11" (1.803 m)     Physical Exam  Vitals and nursing note reviewed.   Constitutional:       General: He is not in acute distress.     Appearance: He is well-developed. He is not diaphoretic.   HENT:      Head: Normocephalic and atraumatic.   Eyes:      General:         Right eye: No discharge.         Left eye: No discharge.      Conjunctiva/sclera: Conjunctivae normal.      Pupils: Pupils are equal, round, and reactive to light.   Neck:      Thyroid: No thyromegaly.   Cardiovascular:      Rate and Rhythm: Normal rate and regular rhythm.      Heart sounds: Normal heart sounds. No murmur heard.  Pulmonary:      Effort: Pulmonary effort is normal. No respiratory distress.      Breath sounds: Normal breath sounds. No wheezing or rales.   Musculoskeletal:      Cervical back: Normal range of motion and neck supple.   Lymphadenopathy:      Cervical: No cervical adenopathy.   Skin:     General: Skin is warm and dry.   Neurological:      Mental " Status: He is alert and oriented to person, place, and time.   Psychiatric:         Mood and Affect: Mood normal.         Behavior: Behavior normal.     Assessment/Plan:     1. Hypertension, unspecified type  Stable, continue current med  2. Myalgia due to statin  Continue zetia  3. Hyperlipidemia, unspecified hyperlipidemia type   Continue zetia   Follow up in about 6 months (around 3/9/2023) for Annual/Annual Wellness.

## 2022-09-16 ENCOUNTER — DOCUMENTATION ONLY (OUTPATIENT)
Dept: FAMILY MEDICINE | Facility: CLINIC | Age: 52
End: 2022-09-16
Payer: COMMERCIAL

## 2023-03-13 ENCOUNTER — OFFICE VISIT (OUTPATIENT)
Dept: FAMILY MEDICINE | Facility: CLINIC | Age: 53
End: 2023-03-13
Payer: COMMERCIAL

## 2023-03-13 VITALS
DIASTOLIC BLOOD PRESSURE: 78 MMHG | BODY MASS INDEX: 26.32 KG/M2 | RESPIRATION RATE: 18 BRPM | OXYGEN SATURATION: 99 % | HEIGHT: 71 IN | WEIGHT: 188 LBS | SYSTOLIC BLOOD PRESSURE: 124 MMHG

## 2023-03-13 DIAGNOSIS — Z12.5 SCREENING FOR MALIGNANT NEOPLASM OF PROSTATE: ICD-10-CM

## 2023-03-13 DIAGNOSIS — D64.9 ANEMIA, UNSPECIFIED TYPE: ICD-10-CM

## 2023-03-13 DIAGNOSIS — I10 HYPERTENSION, UNSPECIFIED TYPE: ICD-10-CM

## 2023-03-13 DIAGNOSIS — Z11.4 ENCOUNTER FOR SCREENING FOR HIV: ICD-10-CM

## 2023-03-13 DIAGNOSIS — Z86.73 HISTORY OF TRANSIENT ISCHEMIC ATTACK: ICD-10-CM

## 2023-03-13 DIAGNOSIS — Z11.59 ENCOUNTER FOR HEPATITIS C SCREENING TEST FOR LOW RISK PATIENT: ICD-10-CM

## 2023-03-13 DIAGNOSIS — M79.10 MYALGIA DUE TO STATIN: ICD-10-CM

## 2023-03-13 DIAGNOSIS — E55.9 VITAMIN D DEFICIENCY: ICD-10-CM

## 2023-03-13 DIAGNOSIS — T46.6X5A MYALGIA DUE TO STATIN: ICD-10-CM

## 2023-03-13 DIAGNOSIS — N52.9 ERECTILE DYSFUNCTION, UNSPECIFIED ERECTILE DYSFUNCTION TYPE: ICD-10-CM

## 2023-03-13 DIAGNOSIS — Z00.00 WELLNESS EXAMINATION: Primary | ICD-10-CM

## 2023-03-13 DIAGNOSIS — E78.5 HYPERLIPIDEMIA, UNSPECIFIED HYPERLIPIDEMIA TYPE: ICD-10-CM

## 2023-03-13 PROCEDURE — 3074F PR MOST RECENT SYSTOLIC BLOOD PRESSURE < 130 MM HG: ICD-10-PCS | Mod: CPTII,,, | Performed by: INTERNAL MEDICINE

## 2023-03-13 PROCEDURE — 1160F PR REVIEW ALL MEDS BY PRESCRIBER/CLIN PHARMACIST DOCUMENTED: ICD-10-PCS | Mod: CPTII,,, | Performed by: INTERNAL MEDICINE

## 2023-03-13 PROCEDURE — 3078F DIAST BP <80 MM HG: CPT | Mod: CPTII,,, | Performed by: INTERNAL MEDICINE

## 2023-03-13 PROCEDURE — 1159F PR MEDICATION LIST DOCUMENTED IN MEDICAL RECORD: ICD-10-PCS | Mod: CPTII,,, | Performed by: INTERNAL MEDICINE

## 2023-03-13 PROCEDURE — 3008F BODY MASS INDEX DOCD: CPT | Mod: CPTII,,, | Performed by: INTERNAL MEDICINE

## 2023-03-13 PROCEDURE — 99396 PREV VISIT EST AGE 40-64: CPT | Mod: ,,, | Performed by: INTERNAL MEDICINE

## 2023-03-13 PROCEDURE — 3008F PR BODY MASS INDEX (BMI) DOCUMENTED: ICD-10-PCS | Mod: CPTII,,, | Performed by: INTERNAL MEDICINE

## 2023-03-13 PROCEDURE — 99396 PR PREVENTIVE VISIT,EST,40-64: ICD-10-PCS | Mod: ,,, | Performed by: INTERNAL MEDICINE

## 2023-03-13 PROCEDURE — 1160F RVW MEDS BY RX/DR IN RCRD: CPT | Mod: CPTII,,, | Performed by: INTERNAL MEDICINE

## 2023-03-13 PROCEDURE — 3074F SYST BP LT 130 MM HG: CPT | Mod: CPTII,,, | Performed by: INTERNAL MEDICINE

## 2023-03-13 PROCEDURE — 3078F PR MOST RECENT DIASTOLIC BLOOD PRESSURE < 80 MM HG: ICD-10-PCS | Mod: CPTII,,, | Performed by: INTERNAL MEDICINE

## 2023-03-13 PROCEDURE — 1159F MED LIST DOCD IN RCRD: CPT | Mod: CPTII,,, | Performed by: INTERNAL MEDICINE

## 2023-03-13 RX ORDER — EZETIMIBE 10 MG/1
10 TABLET ORAL DAILY
Qty: 90 TABLET | Refills: 3 | Status: SHIPPED | OUTPATIENT
Start: 2023-03-13

## 2023-03-13 RX ORDER — SILDENAFIL 25 MG/1
25 TABLET, FILM COATED ORAL DAILY PRN
Qty: 6 TABLET | Refills: 11 | Status: SHIPPED | OUTPATIENT
Start: 2023-03-13 | End: 2023-08-22

## 2023-03-13 NOTE — PROGRESS NOTES
Subjective:      Patient ID: Jeffrey Regan is a 53 y.o. male.    Chief Complaint: Annual Exam    HPI  Patient is doing well but says he is tired, just feels stressed  Sleeps well, says that he is still working out but feels stressed because a lot of work and then work at home as well  Says he does not decompress much  Reporting some issues with ED as well now would like medication refilled for this     HTN:  current med: amlodipine 5 mg daily  BP controlled    HLD:  current med: Zetia 10 mg daily; did not tolerate statin secondary to myalgias  does supplement with: MV; Omega 3 capsule, Zinc, Vitamin D/C, aspirin    History of TIA:  Patient reports that in 2017 and  he had two flare ups, one of neck pain along with numbness to R hand and second with R neck pain. Went to ED in 2017, was told possible TIA, work up including brain imaging, carotid artery US, calcium score, holter monitor reportedly normal. Then during second attack with his neck and chest pain, went to cardiology, Dr. Royal and angiogram performed reportedly normal as well. Was given medication during that time for HTN but was not told of HTN issues before. He says that he was tried on statin or zetia but did not tolerate side effects. He reports still having angina but can't tell if it's just a neck spasm to L neck now that he gets.    Vitamin D Deficiency; Takes Vitamin D3 daily. Unsure of dosage  ED: intermittent use of Viagra    Past Surgery History:  none      Family History:  Mom: DM, HTN,  in  CVA  Dad: DM with complications, PVD, emphysema,    Siblings: 8 in total  Sister: DM coma   Brother: Liver failure   Brother: heart condition  Brother: prostate cancer, lung cancer    COVID 19 vaccine: x 3 doses completed  Colonoscopy in 2019 with Dr. Carrillo  repeat due Dec 2024  Shingrix vaccine: education provided, not ready today  Health Maintenance Due   Topic Date Due    Hepatitis C Screening  Never done     "HIV Screening  Never done    Shingles Vaccine (1 of 2) Never done    COVID-19 Vaccine (4 - Booster for Moderna series) 01/27/2022    Influenza Vaccine (1) 09/01/2022     Review of Systems   Constitutional:  Positive for fatigue. Negative for chills and fever.   HENT:  Negative for congestion, ear pain, postnasal drip, rhinorrhea, sinus pressure and sore throat.    Eyes:  Negative for itching and visual disturbance.   Respiratory:  Negative for cough, shortness of breath and wheezing.    Cardiovascular:  Negative for chest pain, palpitations and leg swelling.   Gastrointestinal:  Negative for abdominal pain and nausea.   Genitourinary:  Negative for dysuria.   Musculoskeletal:  Negative for arthralgias and myalgias.   Skin:  Negative for rash.   Neurological:  Positive for headaches. Negative for weakness and light-headedness.   A comprehensive ROS was performed and is negative except as noted above.  Objective:     Vitals:    03/13/23 1057   BP: 124/78   BP Location: Left arm   Patient Position: Sitting   BP Method: Large (Automatic)   Resp: 18   SpO2: 99%   Weight: 85.3 kg (188 lb)   Height: 5' 11" (1.803 m)     Physical Exam  Vitals and nursing note reviewed.   Constitutional:       General: He is not in acute distress.     Appearance: He is well-developed. He is not diaphoretic.   HENT:      Head: Normocephalic and atraumatic.   Eyes:      General:         Right eye: No discharge.         Left eye: No discharge.      Conjunctiva/sclera: Conjunctivae normal.      Pupils: Pupils are equal, round, and reactive to light.   Neck:      Thyroid: No thyromegaly.   Cardiovascular:      Rate and Rhythm: Normal rate and regular rhythm.      Heart sounds: Normal heart sounds. No murmur heard.  Pulmonary:      Effort: Pulmonary effort is normal. No respiratory distress.      Breath sounds: Normal breath sounds. No wheezing or rales.   Abdominal:      General: There is no distension.      Palpations: Abdomen is soft.      " Tenderness: There is no abdominal tenderness.   Musculoskeletal:      Cervical back: Normal range of motion and neck supple.   Lymphadenopathy:      Cervical: No cervical adenopathy.   Skin:     General: Skin is warm and dry.   Neurological:      Mental Status: He is alert and oriented to person, place, and time.   Psychiatric:         Mood and Affect: Mood normal.         Behavior: Behavior normal.     Assessment/Plan:     1. Wellness examination  -     Comprehensive Metabolic Panel; Future; Expected date: 03/13/2023  -     Lipid Panel; Future; Expected date: 03/13/2023  -     CBC Auto Differential; Future; Expected date: 03/13/2023  -     Urinalysis; Future; Expected date: 03/13/2023  -     Hepatitis C Antibody; Future; Expected date: 03/13/2023  Fasting labs ordered  Not ready for shingrix vaccine  Some migraines, could be relatedto vision- advise to check eye sight, also could be from fasting, advised him to consider breaking fast if he's feeling tired/lethargic  2. Hyperlipidemia, unspecified hyperlipidemia type  Assessment & Plan:  Stable, continue zetia given his statin intolerance, due for labs now    Orders:  -     Comprehensive Metabolic Panel; Future; Expected date: 03/13/2023  -     Lipid Panel; Future; Expected date: 03/13/2023  -     CBC Auto Differential; Future; Expected date: 03/13/2023  -     Urinalysis; Future; Expected date: 03/13/2023  -     Hepatitis C Antibody; Future; Expected date: 03/13/2023  -     ezetimibe (ZETIA) 10 mg tablet; Take 1 tablet (10 mg total) by mouth once daily.  Dispense: 90 tablet; Refill: 3    3. Hypertension, unspecified type  Assessment & Plan:  Stable, BP is at goal, continue current Rx, low sodium diet advised    Orders:  -     Comprehensive Metabolic Panel; Future; Expected date: 03/13/2023  -     Lipid Panel; Future; Expected date: 03/13/2023  -     CBC Auto Differential; Future; Expected date: 03/13/2023  -     Urinalysis; Future; Expected date: 03/13/2023  -      Hepatitis C Antibody; Future; Expected date: 03/13/2023    4. Encounter for hepatitis C screening test for low risk patient  -     Hepatitis C Antibody; Future; Expected date: 03/13/2023    5. Encounter for screening for HIV  -     HIV 1/2 Ag/Ab (4th Gen); Future; Expected date: 03/13/2023    6. Anemia, unspecified type  -     Vitamin B12; Future; Expected date: 03/13/2023  -     Ferritin; Future; Expected date: 03/13/2023  Check iron, vitamin B12 levels now  7. Vitamin D deficiency  Assessment & Plan:  Check vitamin D levels    Orders:  -     Vitamin D; Future; Expected date: 03/13/2023    8. Screening for malignant neoplasm of prostate  -     PSA, Screening; Future; Expected date: 03/13/2023    9. Erectile dysfunction, unspecified erectile dysfunction type  Assessment & Plan:  Suspect sx worsen because of stress levels, recommend check total testosterone now, refill viagra    Orders:  -     sildenafiL (VIAGRA) 25 MG tablet; Take 1 tablet (25 mg total) by mouth daily as needed for Erectile Dysfunction.  Dispense: 6 tablet; Refill: 11  -     Testosterone; Future; Expected date: 03/13/2023    10. History of transient ischemic attack  Assessment & Plan:  Stable, sx controlled, no residual deficit, on zetia and amlodipine, statin intolerant      11. Myalgia due to statin  Assessment & Plan:  Stable, check lipid panel, continue zetia and low fat diet         Follow up in about 6 months (around 9/13/2023) for Follow Up - Chronic Conditions; 1 year wellness .    This includes face to face time and non-face to face time preparing to see the patient (eg, review of tests), obtaining and/or reviewing separately obtained history, documenting clinical information in the electronic or other health record, independently interpreting results and communicating results to the patient/family/caregiver, or care coordinator.

## 2023-03-14 ENCOUNTER — LAB VISIT (OUTPATIENT)
Dept: LAB | Facility: HOSPITAL | Age: 53
End: 2023-03-14
Attending: INTERNAL MEDICINE
Payer: COMMERCIAL

## 2023-03-14 DIAGNOSIS — Z00.00 WELLNESS EXAMINATION: ICD-10-CM

## 2023-03-14 DIAGNOSIS — N52.9 ERECTILE DYSFUNCTION, UNSPECIFIED ERECTILE DYSFUNCTION TYPE: ICD-10-CM

## 2023-03-14 DIAGNOSIS — Z11.59 ENCOUNTER FOR HEPATITIS C SCREENING TEST FOR LOW RISK PATIENT: ICD-10-CM

## 2023-03-14 DIAGNOSIS — D64.9 ANEMIA, UNSPECIFIED TYPE: ICD-10-CM

## 2023-03-14 DIAGNOSIS — E55.9 VITAMIN D DEFICIENCY: ICD-10-CM

## 2023-03-14 DIAGNOSIS — Z11.4 ENCOUNTER FOR SCREENING FOR HIV: ICD-10-CM

## 2023-03-14 DIAGNOSIS — D72.819 LEUKOPENIA, UNSPECIFIED TYPE: Primary | ICD-10-CM

## 2023-03-14 DIAGNOSIS — Z12.5 SCREENING FOR MALIGNANT NEOPLASM OF PROSTATE: ICD-10-CM

## 2023-03-14 DIAGNOSIS — E78.5 HYPERLIPIDEMIA, UNSPECIFIED HYPERLIPIDEMIA TYPE: ICD-10-CM

## 2023-03-14 DIAGNOSIS — I10 HYPERTENSION, UNSPECIFIED TYPE: ICD-10-CM

## 2023-03-14 LAB
ABS NEUT CALC (OHS): 0.62 X10(3)/MCL (ref 2.1–9.2)
ALBUMIN SERPL-MCNC: 4.2 G/DL (ref 3.5–5)
ALBUMIN/GLOB SERPL: 1.4 RATIO (ref 1.1–2)
ALP SERPL-CCNC: 51 UNIT/L (ref 40–150)
ALT SERPL-CCNC: 22 UNIT/L (ref 0–55)
AST SERPL-CCNC: 24 UNIT/L (ref 5–34)
BILIRUBIN DIRECT+TOT PNL SERPL-MCNC: 0.7 MG/DL
BUN SERPL-MCNC: 16.2 MG/DL (ref 8.4–25.7)
CALCIUM SERPL-MCNC: 9.5 MG/DL (ref 8.4–10.2)
CHLORIDE SERPL-SCNC: 107 MMOL/L (ref 98–107)
CHOLEST SERPL-MCNC: 166 MG/DL
CHOLEST/HDLC SERPL: 3 {RATIO} (ref 0–5)
CO2 SERPL-SCNC: 26 MMOL/L (ref 22–29)
CREAT SERPL-MCNC: 1.03 MG/DL (ref 0.73–1.18)
DEPRECATED CALCIDIOL+CALCIFEROL SERPL-MC: 32.4 NG/ML (ref 30–80)
EOSINOPHIL NFR BLD MANUAL: 0.27 X10(3)/MCL (ref 0–0.9)
EOSINOPHIL NFR BLD MANUAL: 7 % (ref 0–8)
ERYTHROCYTE [DISTWIDTH] IN BLOOD BY AUTOMATED COUNT: 13 % (ref 11.5–17)
FERRITIN SERPL-MCNC: 49.8 NG/ML (ref 21.81–274.66)
GFR SERPLBLD CREATININE-BSD FMLA CKD-EPI: >60 MLS/MIN/1.73/M2
GLOBULIN SER-MCNC: 3 GM/DL (ref 2.4–3.5)
GLUCOSE SERPL-MCNC: 93 MG/DL (ref 74–100)
HCT VFR BLD AUTO: 41.1 % (ref 42–52)
HCV AB SERPL QL IA: NONREACTIVE
HDLC SERPL-MCNC: 52 MG/DL (ref 35–60)
HGB BLD-MCNC: 13.4 G/DL (ref 14–18)
HIV 1+2 AB+HIV1 P24 AG SERPL QL IA: NONREACTIVE
LDLC SERPL CALC-MCNC: 101 MG/DL (ref 50–140)
LYMPHOCYTES NFR BLD MANUAL: 2.3 X10(3)/MCL
LYMPHOCYTES NFR BLD MANUAL: 59 % (ref 13–40)
MCH RBC QN AUTO: 30.5 PG
MCHC RBC AUTO-ENTMCNC: 32.6 G/DL (ref 33–36)
MCV RBC AUTO: 93.6 FL (ref 80–94)
MONOCYTES NFR BLD MANUAL: 0.7 X10(3)/MCL (ref 0.1–1.3)
MONOCYTES NFR BLD MANUAL: 18 % (ref 2–11)
NEUTROPHILS NFR BLD MANUAL: 16 % (ref 47–80)
NRBC BLD AUTO-RTO: 0 %
PLATELET # BLD AUTO: 254 X10(3)/MCL (ref 130–400)
PLATELET # BLD EST: ADEQUATE 10*3/UL
PMV BLD AUTO: 10 FL (ref 7.4–10.4)
POTASSIUM SERPL-SCNC: 4.2 MMOL/L (ref 3.5–5.1)
PROT SERPL-MCNC: 7.2 GM/DL (ref 6.4–8.3)
PSA SERPL-MCNC: 0.65 NG/ML
RBC # BLD AUTO: 4.39 X10(6)/MCL (ref 4.7–6.1)
RBC MORPH BLD: NORMAL
SODIUM SERPL-SCNC: 142 MMOL/L (ref 136–145)
TESTOST SERPL-MCNC: 611.3 NG/DL (ref 220.91–715.81)
TRIGL SERPL-MCNC: 67 MG/DL (ref 34–140)
VIT B12 SERPL-MCNC: 1017 PG/ML (ref 213–816)
VLDLC SERPL CALC-MCNC: 13 MG/DL
WBC # SPEC AUTO: 3.9 X10(3)/MCL (ref 4.5–11.5)

## 2023-03-14 PROCEDURE — 84153 ASSAY OF PSA TOTAL: CPT

## 2023-03-14 PROCEDURE — 82607 VITAMIN B-12: CPT

## 2023-03-14 PROCEDURE — 82728 ASSAY OF FERRITIN: CPT

## 2023-03-14 PROCEDURE — 82306 VITAMIN D 25 HYDROXY: CPT

## 2023-03-14 PROCEDURE — 87389 HIV-1 AG W/HIV-1&-2 AB AG IA: CPT

## 2023-03-14 PROCEDURE — 80061 LIPID PANEL: CPT

## 2023-03-14 PROCEDURE — 36415 COLL VENOUS BLD VENIPUNCTURE: CPT

## 2023-03-14 PROCEDURE — 80053 COMPREHEN METABOLIC PANEL: CPT

## 2023-03-14 PROCEDURE — 85027 COMPLETE CBC AUTOMATED: CPT

## 2023-03-14 PROCEDURE — 84403 ASSAY OF TOTAL TESTOSTERONE: CPT

## 2023-03-14 PROCEDURE — 85025 COMPLETE CBC W/AUTO DIFF WBC: CPT

## 2023-03-14 PROCEDURE — 86803 HEPATITIS C AB TEST: CPT

## 2023-06-14 ENCOUNTER — OFFICE VISIT (OUTPATIENT)
Dept: URGENT CARE | Facility: CLINIC | Age: 53
End: 2023-06-14
Payer: COMMERCIAL

## 2023-06-14 VITALS
OXYGEN SATURATION: 98 % | WEIGHT: 192 LBS | HEART RATE: 65 BPM | DIASTOLIC BLOOD PRESSURE: 74 MMHG | SYSTOLIC BLOOD PRESSURE: 122 MMHG | TEMPERATURE: 98 F | RESPIRATION RATE: 18 BRPM | HEIGHT: 71 IN | BODY MASS INDEX: 26.88 KG/M2

## 2023-06-14 DIAGNOSIS — M25.571 ACUTE RIGHT ANKLE PAIN: Primary | ICD-10-CM

## 2023-06-14 PROCEDURE — 99213 OFFICE O/P EST LOW 20 MIN: CPT | Mod: ,,,

## 2023-06-14 PROCEDURE — 99213 PR OFFICE/OUTPT VISIT, EST, LEVL III, 20-29 MIN: ICD-10-PCS | Mod: ,,,

## 2023-06-14 RX ORDER — NABUMETONE 500 MG/1
500 TABLET, FILM COATED ORAL 2 TIMES DAILY PRN
Qty: 20 TABLET | Refills: 0 | Status: SHIPPED | OUTPATIENT
Start: 2023-06-14 | End: 2023-06-24

## 2023-06-14 NOTE — PATIENT INSTRUCTIONS
Will call with official x-ray results once received by Radiology    Rest the extremity/ area of injury.  Ice the area for 10- 15 minutes at a time a few times daily to assist with pain and swelling.  Elevate extremity above the level of the heart while resting to avoid excessive swelling.   Over-the-counter Tylenol as needed for pain.  Prescription strength anti-inflammatories as prescribed.  Do not take other NSAIDs such as Advil, ibuprofen, Aleve while taking prescription.  Get plenty of rest.  Follow-up with primary care provider.   Follow-up with Orthopedics as needed.  If pain persists and a referral is needed, one can be placed for you by the clinic.    Go to the Emergency Department with any significant change or worsening symptoms.

## 2023-06-14 NOTE — PROGRESS NOTES
"Subjective:      Patient ID: Jeffrey Regan is a 53 y.o. male.    Vitals:  height is 5' 11" (1.803 m) and weight is 87.1 kg (192 lb). His temperature is 98.4 °F (36.9 °C). His blood pressure is 122/74 and his pulse is 65. His respiration is 18 and oxygen saturation is 98%.     Chief Complaint: Pain ( Patient is a 53 y.o. male who presents to urgent care with complaints of stepping wrong on Monday while stepping down. He said he felt a little something. He didn't twist his ankle. He says that it didn't hurt Monday but today it does, it is his right ankle x2 days. Alleviating factors include Advil with mild amount of relief. Patient denies swelling and discoloration. )    53-year-old male past medical history of hypertension hyperlipidemia who presents clinic complaining of right ankle pain over the last 2 days.  Patient reports he believes he stepped incorrectly and has had pain since, denies any known injury or trauma, fall otherwise, denies twisting the ankle.  Reports pain is primarily below the medial malleolus.  Denies fever, body aches, swelling, bruising, erythema or warmth.  Denies any pain in the foot or the leg.      Pain      Musculoskeletal:  Positive for pain.   Skin:  Negative for erythema.    Objective:     Physical Exam   Constitutional: He is oriented to person, place, and time. He appears well-developed.   HENT:   Head: Normocephalic and atraumatic. Head is without abrasion, without contusion and without laceration.   Ears:   Right Ear: External ear normal.   Left Ear: External ear normal.   Nose: Nose normal.   Mouth/Throat: Oropharynx is clear and moist and mucous membranes are normal.   Eyes: Conjunctivae, EOM and lids are normal. Pupils are equal, round, and reactive to light.   Neck: Trachea normal and phonation normal. Neck supple.   Cardiovascular: Normal rate, regular rhythm and normal heart sounds.   Pulmonary/Chest: Effort normal and breath sounds normal. No stridor. No respiratory " distress.   Musculoskeletal: Normal range of motion.         General: No swelling or deformity. Normal range of motion.      Right ankle: He exhibits no swelling, no ecchymosis, no deformity and normal pulse. Tenderness. Medial malleolus tenderness found.      Right lower leg: He exhibits no tenderness. No edema.      Left lower leg: No edema.      Comments: Mild TTP near & below the medial malleolus   Neurological: He is alert and oriented to person, place, and time.   Skin: Skin is warm, dry, intact and no rash. Capillary refill takes less than 2 seconds. No abrasion, No burn, No bruising, No erythema and No ecchymosis   Psychiatric: His speech is normal and behavior is normal. Judgment and thought content normal.   Nursing note and vitals reviewed.  Ankle x-ray:  No acute osseous abnormality identified, awaiting radiology over  Assessment:     1. Acute right ankle pain        Plan:       Acute right ankle pain  -     XR ANKLE COMPLETE 3 VIEW RIGHT; Future; Expected date: 06/14/2023      X-ray appears negative, discussed possible causes of ankle pain, discussed steroid injection however patient declines.  Will attempt prescription NSAIDs and patient will call back if symptoms fail to improve.      Will call with official x-ray results once received by Radiology      Rest the extremity/ area of injury.  Ice the area for 10- 15 minutes at a time a few times daily to assist with pain and swelling.  Elevate extremity above the level of the heart while resting to avoid excessive swelling.   Over-the-counter Tylenol as needed for pain.  Prescription strength anti-inflammatories as prescribed.  Do not take other NSAIDs such as Advil, ibuprofen, Aleve while taking prescription.  Get plenty of rest.  Follow-up with primary care provider.   Follow-up with Orthopedics as needed.  If pain persists and a referral is needed, one can be placed for you by the clinic.    Go to the Emergency Department with any significant change  or worsening symptoms.

## 2023-06-19 ENCOUNTER — TELEPHONE (OUTPATIENT)
Dept: URGENT CARE | Facility: CLINIC | Age: 53
End: 2023-06-19
Payer: COMMERCIAL

## 2023-06-19 DIAGNOSIS — M25.571 ACUTE RIGHT ANKLE PAIN: Primary | ICD-10-CM

## 2023-06-28 ENCOUNTER — HOSPITAL ENCOUNTER (OUTPATIENT)
Dept: RADIOLOGY | Facility: CLINIC | Age: 53
Discharge: HOME | End: 2023-06-28
Attending: PHYSICIAN ASSISTANT
Payer: COMMERCIAL

## 2023-06-28 ENCOUNTER — OFFICE VISIT (OUTPATIENT)
Dept: ORTHOPEDICS | Facility: CLINIC | Age: 53
End: 2023-06-28
Payer: COMMERCIAL

## 2023-06-28 VITALS
WEIGHT: 192.81 LBS | HEIGHT: 71 IN | SYSTOLIC BLOOD PRESSURE: 128 MMHG | HEART RATE: 73 BPM | DIASTOLIC BLOOD PRESSURE: 81 MMHG | BODY MASS INDEX: 26.99 KG/M2

## 2023-06-28 DIAGNOSIS — M79.671 RIGHT FOOT PAIN: ICD-10-CM

## 2023-06-28 DIAGNOSIS — M76.821 POSTERIOR TIBIAL TENDONITIS, RIGHT: ICD-10-CM

## 2023-06-28 DIAGNOSIS — M79.671 RIGHT FOOT PAIN: Primary | ICD-10-CM

## 2023-06-28 DIAGNOSIS — M21.6X1 PRONATION OF RIGHT FOOT: ICD-10-CM

## 2023-06-28 PROCEDURE — 1160F PR REVIEW ALL MEDS BY PRESCRIBER/CLIN PHARMACIST DOCUMENTED: ICD-10-PCS | Mod: CPTII,,, | Performed by: PHYSICIAN ASSISTANT

## 2023-06-28 PROCEDURE — 73630 X-RAY EXAM OF FOOT: CPT | Mod: RT,,, | Performed by: PHYSICIAN ASSISTANT

## 2023-06-28 PROCEDURE — 1159F PR MEDICATION LIST DOCUMENTED IN MEDICAL RECORD: ICD-10-PCS | Mod: CPTII,,, | Performed by: PHYSICIAN ASSISTANT

## 2023-06-28 PROCEDURE — 99203 OFFICE O/P NEW LOW 30 MIN: CPT | Mod: ,,, | Performed by: PHYSICIAN ASSISTANT

## 2023-06-28 PROCEDURE — 3008F PR BODY MASS INDEX (BMI) DOCUMENTED: ICD-10-PCS | Mod: CPTII,,, | Performed by: PHYSICIAN ASSISTANT

## 2023-06-28 PROCEDURE — 3008F BODY MASS INDEX DOCD: CPT | Mod: CPTII,,, | Performed by: PHYSICIAN ASSISTANT

## 2023-06-28 PROCEDURE — 3074F PR MOST RECENT SYSTOLIC BLOOD PRESSURE < 130 MM HG: ICD-10-PCS | Mod: CPTII,,, | Performed by: PHYSICIAN ASSISTANT

## 2023-06-28 PROCEDURE — 73630 XR FOOT COMPLETE 3 VIEW RIGHT: ICD-10-PCS | Mod: RT,,, | Performed by: PHYSICIAN ASSISTANT

## 2023-06-28 PROCEDURE — 1160F RVW MEDS BY RX/DR IN RCRD: CPT | Mod: CPTII,,, | Performed by: PHYSICIAN ASSISTANT

## 2023-06-28 PROCEDURE — 1159F MED LIST DOCD IN RCRD: CPT | Mod: CPTII,,, | Performed by: PHYSICIAN ASSISTANT

## 2023-06-28 PROCEDURE — 3074F SYST BP LT 130 MM HG: CPT | Mod: CPTII,,, | Performed by: PHYSICIAN ASSISTANT

## 2023-06-28 PROCEDURE — 3079F DIAST BP 80-89 MM HG: CPT | Mod: CPTII,,, | Performed by: PHYSICIAN ASSISTANT

## 2023-06-28 PROCEDURE — 99203 PR OFFICE/OUTPT VISIT, NEW, LEVL III, 30-44 MIN: ICD-10-PCS | Mod: ,,, | Performed by: PHYSICIAN ASSISTANT

## 2023-06-28 PROCEDURE — 3079F PR MOST RECENT DIASTOLIC BLOOD PRESSURE 80-89 MM HG: ICD-10-PCS | Mod: CPTII,,, | Performed by: PHYSICIAN ASSISTANT

## 2023-06-28 NOTE — PROGRESS NOTES
"Chief Complaint:   Chief Complaint   Patient presents with    Injury     R ankle/foot started  about 2 wks ago. unsure if he stepped wrong. the following day he was not able to put pressure. pain on the lateral side of the foot. went to Urgent clinic has been feeling a little bit tender. bought new soles for his shoes with some improvement.        History of present illness:      This is a 53 y.o. year old male who complains of left inner side foot and ankle pain.    He states about 2 weeks ago he started having some pain on the inside aspect.    He reports no specific injury or trauma but did go on vacation where he was wearing water shoes with no support at a water park.    He did go to urgent care took x-rays and told him he had no bony problems and put him on anti-inflammatory.    In the meantime also got inserts for his shoes states have begun to help his pain and symptoms    Review of Systems:    Constitution:   Denies chills, fever, and sweats.  HENT:   Denies headaches or blurry vision.  Cardiovascular:  Denies chest pain or irregular heart beat.  Respiratory:   Denies cough or shortness of breath.  Gastrointestinal:  Denies abdominal pain, nausea, or vomiting.  Musculoskeletal:   Denies muscle cramps.  Neurological:   Denies dizziness or focal weakness.  Psychiatric/Behavior: Normal mental status.  Hematology/Lymph:  Denies bleeding problem or easy bruising/bleeding.  Skin:    Denies rash or suspicious lesions.    Examination:    Vital Signs:    Vitals:    06/28/23 0757   BP: 128/81   Pulse: 73   Weight: 87.5 kg (192 lb 12.8 oz)   Height: 5' 11" (1.803 m)   PainSc:   4       Body mass index is 26.89 kg/m².    Constitution:   Well-developed, well nourished patient in no acute distress.  Neurological:   Alert and oriented x 3 and cooperative to examination.     Psychiatric/Behavior: Normal mental status.  Respiratory:   No shortness of breath.  Eyes:    Extraoccular muscles intact  Skin:    No scars, rash or " suspicious lesions.    Physical Exam:   Right foot and ankle  No obvious deformity. Plantar flexion and dorsiflexion is 60 degrees and 30 degrees, respectively.  Negative squeeze test.  Negative lateral malleolus tenderness.  Negative medial malleolus tenderness.  Negative talofibular ligament tenderness.  Negative anterior draw and lateral tilt.  5/5 strength, normal skin appearance and palpable pulses distally.  Sensibility normal.  2+ dorsalis pedis pulse.    Intact motor and sensation  Tenderness to palpation over the posterior tib tendon area just distal to the medial malleolus  Pes planus with weight-bearing    Imaging: X-rays ordered and images interpreted today personally by me of right foot three views   No acute osseous abnormalities noted           Assessment: Right foot pain  -     X-Ray Foot Complete Right; Future; Expected date: 06/28/2023    Pronation of right foot    Posterior tibial tendonitis, right         Plan:  At this point the patient has begun the proper treatments with arch supports for his shoes.    Needs to not go barefoot at any point in wear shoe with good arch at all times.    He is going to continue using the anti-inflammatories prescribed to him by the urgent care as he is not been using those presently.    He can also use over-the-counter anti-inflammatories once this prescription runs out.      He will follow up on a as needed basis          DISCLAIMER: This note may have been dictated using voice recognition software and may contain grammatical errors.     NOTE: Consult report sent to referring provider via [employer].

## 2023-08-22 ENCOUNTER — OFFICE VISIT (OUTPATIENT)
Dept: URGENT CARE | Facility: CLINIC | Age: 53
End: 2023-08-22
Payer: COMMERCIAL

## 2023-08-22 VITALS
TEMPERATURE: 99 F | SYSTOLIC BLOOD PRESSURE: 105 MMHG | DIASTOLIC BLOOD PRESSURE: 67 MMHG | HEART RATE: 86 BPM | BODY MASS INDEX: 26.88 KG/M2 | WEIGHT: 192 LBS | HEIGHT: 71 IN | OXYGEN SATURATION: 100 % | RESPIRATION RATE: 16 BRPM

## 2023-08-22 DIAGNOSIS — H60.312 DIFFUSE OTITIS EXTERNA OF LEFT EAR, UNSPECIFIED CHRONICITY: Primary | ICD-10-CM

## 2023-08-22 PROCEDURE — 99213 OFFICE O/P EST LOW 20 MIN: CPT | Mod: ,,, | Performed by: FAMILY MEDICINE

## 2023-08-22 PROCEDURE — 99213 PR OFFICE/OUTPT VISIT, EST, LEVL III, 20-29 MIN: ICD-10-PCS | Mod: ,,, | Performed by: FAMILY MEDICINE

## 2023-08-22 RX ORDER — OFLOXACIN 3 MG/ML
5 SOLUTION AURICULAR (OTIC) DAILY
Qty: 5 ML | Refills: 0 | Status: SHIPPED | OUTPATIENT
Start: 2023-08-22 | End: 2023-08-22

## 2023-08-22 RX ORDER — OFLOXACIN 3 MG/ML
5 SOLUTION AURICULAR (OTIC) DAILY
Qty: 5 ML | Refills: 0 | Status: SHIPPED | OUTPATIENT
Start: 2023-08-22 | End: 2023-08-25

## 2023-08-22 NOTE — PROGRESS NOTES
"Subjective:      Patient ID: Jeffrey Regan is a 53 y.o. male.    Vitals:  height is 5' 11" (1.803 m) and weight is 87.1 kg (192 lb). His temperature is 99 °F (37.2 °C). His blood pressure is 105/67 and his pulse is 86. His respiration is 16 and oxygen saturation is 100%.     Chief Complaint: Ear Problem (Left ear pain started yesterday. No otc meds taken. )    HPI:  53-year-old male present to clinic with concerns of left ear pain and discomfort since yesterday.  States prior to the symptoms felt wax buildup, used over-the-counter ear flush, states removed lot of wax.  Since then it has been hurting.  No recent upper or lower respiratory infections.  No sore throat, no toothache, no fever    ROS :  Constitutional : No fever  Neck : No pain  HEENT : No sore throat, No difficulty swallowing. As per HPI  Respiratory : No coughing, no shortness of breath  Cardiovascular : No chest pain  Integumentary : No skin rash  Neurological : No tingling, numbness or weakness   Objective:     Physical Exam  General : Alert and Oriented, No apparent distress, afebrile  Neck - supple  HENT : Oropharynx no redness or swelling.  Right TM intact no redness, left ear canal appears erythematous, excoriation, TM no redness  Respiratory : Bilateral equal breath sounds, nonlabored respirations  Cardiovascular : Rate, rhythm regular, normal volume pulse, no murmur  Integumentary : Warm, Dry and no rash    Assessment:     1. Diffuse otitis externa of left ear, unspecified chronicity      Plan:   Discussed the physical finding, condition and course.  Monitor the symptoms closely.  Aleve or ibuprofen for pain and discomfort as needed.  Can start medications today.  Call or return to clinic for any questions    Diffuse otitis externa of left ear, unspecified chronicity  -     Discontinue: ofloxacin (FLOXIN) 0.3 % otic solution; Place 5 drops into the left ear once daily. for 3 days  Dispense: 5 mL; Refill: 0  -     ofloxacin (FLOXIN) 0.3 % otic " solution; Place 5 drops into the left ear once daily. for 3 days  Dispense: 5 mL; Refill: 0

## 2023-08-22 NOTE — PATIENT INSTRUCTIONS
Discussed the physical finding, condition and course.  Monitor the symptoms closely.  Aleve or ibuprofen for pain and discomfort as needed.  Can start medications today.  Call or return to clinic for any questions

## 2023-08-25 DIAGNOSIS — I10 HYPERTENSION, UNSPECIFIED TYPE: Primary | ICD-10-CM

## 2023-08-25 RX ORDER — AMLODIPINE BESYLATE 5 MG/1
TABLET ORAL
Qty: 90 TABLET | Refills: 3 | Status: SHIPPED | OUTPATIENT
Start: 2023-08-25

## 2023-09-18 ENCOUNTER — LAB VISIT (OUTPATIENT)
Dept: LAB | Facility: HOSPITAL | Age: 53
End: 2023-09-18
Attending: INTERNAL MEDICINE
Payer: COMMERCIAL

## 2023-09-18 ENCOUNTER — OFFICE VISIT (OUTPATIENT)
Dept: FAMILY MEDICINE | Facility: CLINIC | Age: 53
End: 2023-09-18
Payer: COMMERCIAL

## 2023-09-18 VITALS
DIASTOLIC BLOOD PRESSURE: 86 MMHG | RESPIRATION RATE: 14 BRPM | SYSTOLIC BLOOD PRESSURE: 134 MMHG | WEIGHT: 196.31 LBS | TEMPERATURE: 98 F | HEART RATE: 56 BPM | BODY MASS INDEX: 27.48 KG/M2 | HEIGHT: 71 IN | OXYGEN SATURATION: 99 %

## 2023-09-18 DIAGNOSIS — D64.9 ANEMIA, UNSPECIFIED TYPE: ICD-10-CM

## 2023-09-18 DIAGNOSIS — N52.9 ERECTILE DYSFUNCTION, UNSPECIFIED ERECTILE DYSFUNCTION TYPE: ICD-10-CM

## 2023-09-18 DIAGNOSIS — E78.5 HYPERLIPIDEMIA, UNSPECIFIED HYPERLIPIDEMIA TYPE: Primary | ICD-10-CM

## 2023-09-18 DIAGNOSIS — T46.6X5A MYALGIA DUE TO STATIN: ICD-10-CM

## 2023-09-18 DIAGNOSIS — D72.819 LEUKOPENIA, UNSPECIFIED TYPE: ICD-10-CM

## 2023-09-18 DIAGNOSIS — I10 HYPERTENSION, UNSPECIFIED TYPE: ICD-10-CM

## 2023-09-18 DIAGNOSIS — Z86.73 HISTORY OF TRANSIENT ISCHEMIC ATTACK: ICD-10-CM

## 2023-09-18 DIAGNOSIS — E78.5 HYPERLIPIDEMIA, UNSPECIFIED HYPERLIPIDEMIA TYPE: ICD-10-CM

## 2023-09-18 DIAGNOSIS — M79.10 MYALGIA DUE TO STATIN: ICD-10-CM

## 2023-09-18 LAB
ALBUMIN SERPL-MCNC: 4.3 G/DL (ref 3.5–5)
ALBUMIN/GLOB SERPL: 1.3 RATIO (ref 1.1–2)
ALP SERPL-CCNC: 56 UNIT/L (ref 40–150)
ALT SERPL-CCNC: 23 UNIT/L (ref 0–55)
AST SERPL-CCNC: 25 UNIT/L (ref 5–34)
BASOPHILS # BLD AUTO: 0.07 X10(3)/MCL
BASOPHILS NFR BLD AUTO: 1.2 %
BILIRUB SERPL-MCNC: 0.7 MG/DL
BUN SERPL-MCNC: 17.2 MG/DL (ref 8.4–25.7)
CALCIUM SERPL-MCNC: 10.2 MG/DL (ref 8.4–10.2)
CHLORIDE SERPL-SCNC: 103 MMOL/L (ref 98–107)
CHOLEST SERPL-MCNC: 212 MG/DL
CHOLEST/HDLC SERPL: 4 {RATIO} (ref 0–5)
CO2 SERPL-SCNC: 27 MMOL/L (ref 22–29)
CREAT SERPL-MCNC: 1.15 MG/DL (ref 0.73–1.18)
EOSINOPHIL # BLD AUTO: 0.29 X10(3)/MCL (ref 0–0.9)
EOSINOPHIL NFR BLD AUTO: 4.9 %
ERYTHROCYTE [DISTWIDTH] IN BLOOD BY AUTOMATED COUNT: 13 % (ref 11.5–17)
FERRITIN SERPL-MCNC: 116.12 NG/ML (ref 21.81–274.66)
FOLATE SERPL-MCNC: 13.7 NG/ML (ref 7–31.4)
GFR SERPLBLD CREATININE-BSD FMLA CKD-EPI: >60 MLS/MIN/1.73/M2
GLOBULIN SER-MCNC: 3.3 GM/DL (ref 2.4–3.5)
GLUCOSE SERPL-MCNC: 101 MG/DL (ref 74–100)
HCT VFR BLD AUTO: 41.5 % (ref 42–52)
HDLC SERPL-MCNC: 59 MG/DL (ref 35–60)
HGB BLD-MCNC: 14.1 G/DL (ref 14–18)
IMM GRANULOCYTES # BLD AUTO: 0.01 X10(3)/MCL (ref 0–0.04)
IMM GRANULOCYTES NFR BLD AUTO: 0.2 %
IRON SATN MFR SERPL: 49 % (ref 20–50)
IRON SERPL-MCNC: 200 UG/DL (ref 65–175)
LDLC SERPL CALC-MCNC: 119 MG/DL (ref 50–140)
LYMPHOCYTES # BLD AUTO: 2.35 X10(3)/MCL (ref 0.6–4.6)
LYMPHOCYTES NFR BLD AUTO: 40 %
MCH RBC QN AUTO: 31.4 PG (ref 27–31)
MCHC RBC AUTO-ENTMCNC: 34 G/DL (ref 33–36)
MCV RBC AUTO: 92.4 FL (ref 80–94)
MONOCYTES # BLD AUTO: 0.77 X10(3)/MCL (ref 0.1–1.3)
MONOCYTES NFR BLD AUTO: 13.1 %
NEUTROPHILS # BLD AUTO: 2.38 X10(3)/MCL (ref 2.1–9.2)
NEUTROPHILS NFR BLD AUTO: 40.6 %
NRBC BLD AUTO-RTO: 0 %
PLATELET # BLD AUTO: 231 X10(3)/MCL (ref 130–400)
PMV BLD AUTO: 10.2 FL (ref 7.4–10.4)
POTASSIUM SERPL-SCNC: 4.7 MMOL/L (ref 3.5–5.1)
PROT SERPL-MCNC: 7.6 GM/DL (ref 6.4–8.3)
RBC # BLD AUTO: 4.49 X10(6)/MCL (ref 4.7–6.1)
RET# (OHS): 0.06 (ref 0.03–0.1)
RETICULOCYTE COUNT AUTOMATED (OLG): 1.34 % (ref 1.1–2.1)
SODIUM SERPL-SCNC: 140 MMOL/L (ref 136–145)
TIBC SERPL-MCNC: 206 UG/DL (ref 69–240)
TIBC SERPL-MCNC: 406 UG/DL (ref 250–450)
TRANSFERRIN SERPL-MCNC: 332 MG/DL (ref 174–364)
TRIGL SERPL-MCNC: 172 MG/DL (ref 34–140)
VIT B12 SERPL-MCNC: 978 PG/ML (ref 213–816)
VLDLC SERPL CALC-MCNC: 34 MG/DL
WBC # SPEC AUTO: 5.87 X10(3)/MCL (ref 4.5–11.5)

## 2023-09-18 PROCEDURE — 3079F PR MOST RECENT DIASTOLIC BLOOD PRESSURE 80-89 MM HG: ICD-10-PCS | Mod: CPTII,,, | Performed by: INTERNAL MEDICINE

## 2023-09-18 PROCEDURE — 99214 OFFICE O/P EST MOD 30 MIN: CPT | Mod: ,,, | Performed by: INTERNAL MEDICINE

## 2023-09-18 PROCEDURE — 3075F SYST BP GE 130 - 139MM HG: CPT | Mod: CPTII,,, | Performed by: INTERNAL MEDICINE

## 2023-09-18 PROCEDURE — 83550 IRON BINDING TEST: CPT

## 2023-09-18 PROCEDURE — 3008F BODY MASS INDEX DOCD: CPT | Mod: CPTII,,, | Performed by: INTERNAL MEDICINE

## 2023-09-18 PROCEDURE — 82746 ASSAY OF FOLIC ACID SERUM: CPT

## 2023-09-18 PROCEDURE — 1159F MED LIST DOCD IN RCRD: CPT | Mod: CPTII,,, | Performed by: INTERNAL MEDICINE

## 2023-09-18 PROCEDURE — 85045 AUTOMATED RETICULOCYTE COUNT: CPT

## 2023-09-18 PROCEDURE — 80061 LIPID PANEL: CPT

## 2023-09-18 PROCEDURE — 99214 PR OFFICE/OUTPT VISIT, EST, LEVL IV, 30-39 MIN: ICD-10-PCS | Mod: ,,, | Performed by: INTERNAL MEDICINE

## 2023-09-18 PROCEDURE — 3075F PR MOST RECENT SYSTOLIC BLOOD PRESS GE 130-139MM HG: ICD-10-PCS | Mod: CPTII,,, | Performed by: INTERNAL MEDICINE

## 2023-09-18 PROCEDURE — 82607 VITAMIN B-12: CPT

## 2023-09-18 PROCEDURE — 3008F PR BODY MASS INDEX (BMI) DOCUMENTED: ICD-10-PCS | Mod: CPTII,,, | Performed by: INTERNAL MEDICINE

## 2023-09-18 PROCEDURE — 83020 HEMOGLOBIN ELECTROPHORESIS: CPT

## 2023-09-18 PROCEDURE — 80053 COMPREHEN METABOLIC PANEL: CPT

## 2023-09-18 PROCEDURE — 1160F RVW MEDS BY RX/DR IN RCRD: CPT | Mod: CPTII,,, | Performed by: INTERNAL MEDICINE

## 2023-09-18 PROCEDURE — 1160F PR REVIEW ALL MEDS BY PRESCRIBER/CLIN PHARMACIST DOCUMENTED: ICD-10-PCS | Mod: CPTII,,, | Performed by: INTERNAL MEDICINE

## 2023-09-18 PROCEDURE — 3079F DIAST BP 80-89 MM HG: CPT | Mod: CPTII,,, | Performed by: INTERNAL MEDICINE

## 2023-09-18 PROCEDURE — 1159F PR MEDICATION LIST DOCUMENTED IN MEDICAL RECORD: ICD-10-PCS | Mod: CPTII,,, | Performed by: INTERNAL MEDICINE

## 2023-09-18 PROCEDURE — 85025 COMPLETE CBC W/AUTO DIFF WBC: CPT

## 2023-09-18 PROCEDURE — 36415 COLL VENOUS BLD VENIPUNCTURE: CPT

## 2023-09-18 PROCEDURE — 82728 ASSAY OF FERRITIN: CPT

## 2023-09-18 NOTE — PROGRESS NOTES
Subjective:      Patient ID: Jeffrey Regan is a 53 y.o. male.    Chief Complaint: Hypertension    HPI  6 month f/u  Last wellness 2023    Since last OV was seen by cardiology Dr. Clemente Wallis at that time were to continue zetia + omega 3 fatty acid and return to clinicn in 2023 for ETT and echo    Patient did labs 6 months ago- WBC were slightly low and hgb was still low ; due for repeat labs now     HTN:  current med: amlodipine 5 mg daily  BP controlled    HLD:  current med: Zetia 10 mg daily; did not tolerate statin secondary to myalgias  does supplement with: MV; Omega 3 capsule, Zinc, Vitamin D/C, aspirin    History of TIA:  Patient reports that in  and  he had two flare ups, one of neck pain along with numbness to R hand and second with R neck pain. Went to ED in 2017, was told possible TIA, work up including brain imaging, carotid artery US, calcium score, holter monitor reportedly normal. Then during second attack with his neck and chest pain, went to cardiology, Dr. Royal and angiogram performed reportedly normal as well. Was given medication during that time for HTN but was not told of HTN issues before. He says that he was tried on statin or zetia but did not tolerate side effects. He reports still having angina but can't tell if it's just a neck spasm to L neck now that he gets.    Vitamin D Deficiency; Takes Vitamin D3 daily. Unsure of dosage  ED: intermittent use of Viagra    Past Surgery History:  none      Family History:  Mom: DM, HTN,  in  CVA  Dad: DM with complications, PVD, emphysema,    Siblings: 8 in total  Sister: DM coma   Brother: Liver failure   Brother: heart condition  Brother: prostate cancer, lung cancer    COVID 19 vaccine: x 3 doses completed  Colonoscopy in 2019 with Dr. Carrillo  repeat due Dec 2024  Shingrix vaccine: education provided, not ready today    Health Maintenance Due   Topic Date Due    COVID-19 Vaccine (4 -  "Moderna series) 01/27/2022    Shingles Vaccine (2 of 2) 05/11/2023    Influenza Vaccine (1) 09/01/2023     Review of Systems   Constitutional:  Negative for chills, fatigue and fever.   HENT:  Negative for congestion, ear pain, postnasal drip, rhinorrhea, sinus pressure and sore throat.    Eyes:  Negative for itching and visual disturbance.   Respiratory:  Negative for cough, shortness of breath and wheezing.    Cardiovascular:  Negative for chest pain, palpitations and leg swelling.   Gastrointestinal:  Negative for abdominal pain and nausea.   Genitourinary:  Negative for dysuria.   Musculoskeletal:  Negative for arthralgias and myalgias.   Skin:  Negative for rash.   Neurological:  Negative for weakness, light-headedness and headaches.       Objective:     Vitals:    09/18/23 0905   BP: 134/86   BP Location: Left arm   Patient Position: Sitting   BP Method: Large (Automatic)   Pulse: (!) 56   Resp: 14   Temp: 98.4 °F (36.9 °C)   TempSrc: Temporal   SpO2: 99%   Weight: 89 kg (196 lb 4.8 oz)   Height: 5' 11" (1.803 m)     Physical Exam  Vitals and nursing note reviewed.   Constitutional:       General: He is not in acute distress.     Appearance: Normal appearance. He is not ill-appearing.   HENT:      Head: Normocephalic and atraumatic.   Cardiovascular:      Rate and Rhythm: Normal rate and regular rhythm.   Pulmonary:      Effort: Pulmonary effort is normal.      Breath sounds: Normal breath sounds. No wheezing.   Musculoskeletal:      Cervical back: Neck supple.      Right lower leg: No edema.      Left lower leg: No edema.   Lymphadenopathy:      Cervical: No cervical adenopathy.   Neurological:      Mental Status: He is alert.   Psychiatric:         Behavior: Behavior normal.       Assessment/Plan:     1. Hyperlipidemia, unspecified hyperlipidemia type  -     Comprehensive Metabolic Panel; Future; Expected date: 09/18/2023  -     Lipid Panel; Future; Expected date: 09/18/2023  LDL goal is <100  Repeat lipid " panel now to evaluate  Send copy to Dr. Cornejo  2. Hypertension, unspecified type  Stable ccm  3. History of transient ischemic attack  Stable, continue zetia (cannot tolerate statin)  4. Erectile dysfunction, unspecified erectile dysfunction type  Stable sx controlled  5. Myalgia due to statin  Continue zetia  6. Anemia, unspecified type  -     CBC Auto Differential; Future; Expected date: 09/18/2023  -     Iron and TIBC; Future; Expected date: 09/18/2023  -     Ferritin; Future; Expected date: 09/18/2023  -     Folate; Future; Expected date: 09/18/2023  -     Vitamin B12; Future; Expected date: 09/18/2023  -     Reticulocytes; Future; Expected date: 09/18/2023  -     Path Review, Peripheral Smear; Future; Expected date: 09/18/2023  Labs for low WBC and low Hgb ordered to evaluate       Follow up in about 6 months (around 3/18/2024) for Annual Wellness.    This includes face to face time and non-face to face time preparing to see the patient (eg, review of tests), obtaining and/or reviewing separately obtained history, documenting clinical information in the electronic or other health record, independently interpreting results and communicating results to the patient/family/caregiver, or care coordinator.

## 2023-09-20 LAB
HGB A MFR BLD ELPH: 97.7 % (ref 95.8–98)
HGB A2 MFR BLD ELPH: 2.3 % (ref 2–3.3)
HGB F MFR BLD ELPH: 0 % (ref 0–0.9)
HGB FRACT BLD ELPH-IMP: NORMAL
M HPLC HB VARIANT, B: NORMAL
VIEW PATHOLOGY REPORT (RELIAPATH): NORMAL

## 2024-03-13 DIAGNOSIS — Z12.5 SCREENING FOR MALIGNANT NEOPLASM OF PROSTATE: ICD-10-CM

## 2024-03-13 DIAGNOSIS — Z86.73 HISTORY OF TRANSIENT ISCHEMIC ATTACK: ICD-10-CM

## 2024-03-13 DIAGNOSIS — E78.5 HYPERLIPIDEMIA, UNSPECIFIED HYPERLIPIDEMIA TYPE: ICD-10-CM

## 2024-03-13 DIAGNOSIS — N52.9 ERECTILE DYSFUNCTION, UNSPECIFIED ERECTILE DYSFUNCTION TYPE: ICD-10-CM

## 2024-03-13 DIAGNOSIS — Z00.00 WELLNESS EXAMINATION: Primary | ICD-10-CM

## 2024-03-13 DIAGNOSIS — D64.9 ANEMIA, UNSPECIFIED TYPE: ICD-10-CM

## 2024-03-13 DIAGNOSIS — I10 HYPERTENSION, UNSPECIFIED TYPE: ICD-10-CM

## 2024-04-01 ENCOUNTER — OFFICE VISIT (OUTPATIENT)
Dept: FAMILY MEDICINE | Facility: CLINIC | Age: 54
End: 2024-04-01
Payer: COMMERCIAL

## 2024-04-01 VITALS
BODY MASS INDEX: 27.72 KG/M2 | DIASTOLIC BLOOD PRESSURE: 80 MMHG | RESPIRATION RATE: 16 BRPM | SYSTOLIC BLOOD PRESSURE: 120 MMHG | OXYGEN SATURATION: 98 % | WEIGHT: 198 LBS | HEIGHT: 71 IN | TEMPERATURE: 99 F | HEART RATE: 68 BPM

## 2024-04-01 DIAGNOSIS — N52.9 ERECTILE DYSFUNCTION, UNSPECIFIED ERECTILE DYSFUNCTION TYPE: ICD-10-CM

## 2024-04-01 DIAGNOSIS — E55.9 VITAMIN D DEFICIENCY: ICD-10-CM

## 2024-04-01 DIAGNOSIS — E78.5 HYPERLIPIDEMIA, UNSPECIFIED HYPERLIPIDEMIA TYPE: ICD-10-CM

## 2024-04-01 DIAGNOSIS — Z00.00 WELLNESS EXAMINATION: Primary | ICD-10-CM

## 2024-04-01 DIAGNOSIS — M79.10 MYALGIA DUE TO STATIN: ICD-10-CM

## 2024-04-01 DIAGNOSIS — Z12.5 SCREENING FOR MALIGNANT NEOPLASM OF PROSTATE: ICD-10-CM

## 2024-04-01 DIAGNOSIS — R73.01 ELEVATED FASTING GLUCOSE: ICD-10-CM

## 2024-04-01 DIAGNOSIS — I10 HYPERTENSION, UNSPECIFIED TYPE: ICD-10-CM

## 2024-04-01 DIAGNOSIS — T46.6X5A MYALGIA DUE TO STATIN: ICD-10-CM

## 2024-04-01 DIAGNOSIS — Z86.73 HISTORY OF TRANSIENT ISCHEMIC ATTACK: ICD-10-CM

## 2024-04-01 PROCEDURE — 1159F MED LIST DOCD IN RCRD: CPT | Mod: CPTII,,, | Performed by: INTERNAL MEDICINE

## 2024-04-01 PROCEDURE — 99396 PREV VISIT EST AGE 40-64: CPT | Mod: ,,, | Performed by: INTERNAL MEDICINE

## 2024-04-01 PROCEDURE — 1160F RVW MEDS BY RX/DR IN RCRD: CPT | Mod: CPTII,,, | Performed by: INTERNAL MEDICINE

## 2024-04-01 PROCEDURE — 3074F SYST BP LT 130 MM HG: CPT | Mod: CPTII,,, | Performed by: INTERNAL MEDICINE

## 2024-04-01 PROCEDURE — 3008F BODY MASS INDEX DOCD: CPT | Mod: CPTII,,, | Performed by: INTERNAL MEDICINE

## 2024-04-01 PROCEDURE — 3079F DIAST BP 80-89 MM HG: CPT | Mod: CPTII,,, | Performed by: INTERNAL MEDICINE

## 2024-04-01 NOTE — PROGRESS NOTES
Subjective:      Patient ID: Jeffrey Regan is a 54 y.o. male.    Chief Complaint: Annual Exam    HPI  Wellness           HTN:  current med: amlodipine 5 mg daily  BP controlled    HLD:  current med: Zetia 10 mg daily; did not tolerate statin secondary to myalgias  does supplement with: MV; Omega 3 capsule, Zinc, Vitamin D/C, aspirin    History of TIA:  Patient reports that in  and  he had two flare ups, one of neck pain along with numbness to R hand and second with R neck pain. Went to ED in 2017, was told possible TIA, work up including brain imaging, carotid artery US, calcium score, holter monitor reportedly normal. Then during second attack with his neck and chest pain, went to cardiology, Dr. Royal and angiogram performed reportedly normal as well. Was given medication during that time for HTN but was not told of HTN issues before. He says that he was tried on statin or zetia but did not tolerate side effects. He reports still having angina but can't tell if it's just a neck spasm to L neck now that he gets.    Vitamin D Deficiency; Takes Vitamin D3 daily. Unsure of dosage  ED: intermittent use of Viagra    Past Surgery History:  none      Family History:  Mom: DM, HTN,  in  CVA  Dad: DM with complications, PVD, emphysema,    Siblings: 8 in total  Sister: DM coma   Brother: Liver failure   Brother: heart condition  Brother: prostate cancer, lung cancer    COVID 19 vaccine: x 3 doses completed  Colonoscopy in 2019 with Dr. Carrillo  repeat due Dec 2024  Shingrix vaccine: education provided, not ready today  Health Maintenance Due   Topic Date Due    Shingles Vaccine (2 of 2) 2023    Influenza Vaccine (1) 2023    COVID-19 Vaccine ( season) 2023    TETANUS VACCINE  2024    Hemoglobin A1c (Diabetic Prevention Screening)  02/10/2024     Review of Systems   Constitutional:  Negative for chills, fatigue and fever.   HENT:  Negative for  "congestion, ear pain, postnasal drip, rhinorrhea, sinus pressure and sore throat.    Eyes:  Negative for itching and visual disturbance.   Respiratory:  Negative for cough, shortness of breath and wheezing.    Cardiovascular:  Negative for chest pain, palpitations and leg swelling.   Gastrointestinal:  Negative for abdominal pain and nausea.   Genitourinary:  Negative for dysuria.   Musculoskeletal:  Negative for arthralgias and myalgias.   Skin:  Negative for rash.   Neurological:  Negative for weakness, light-headedness and headaches.       Objective:     Vitals:    04/01/24 0905   BP: 120/80   BP Location: Left arm   Patient Position: Sitting   BP Method: Large (Automatic)   Pulse: 68   Resp: 16   Temp: 98.6 °F (37 °C)   TempSrc: Temporal   SpO2: 98%   Weight: 89.8 kg (198 lb)   Height: 5' 11" (1.803 m)     Physical Exam  Vitals and nursing note reviewed.   Constitutional:       General: He is not in acute distress.     Appearance: He is well-developed. He is not diaphoretic.   HENT:      Head: Normocephalic and atraumatic.      Right Ear: Tympanic membrane normal.      Left Ear: Tympanic membrane normal.      Mouth/Throat:      Pharynx: No oropharyngeal exudate.   Eyes:      General:         Right eye: No discharge.         Left eye: No discharge.      Conjunctiva/sclera: Conjunctivae normal.      Pupils: Pupils are equal, round, and reactive to light.   Neck:      Thyroid: No thyromegaly.   Cardiovascular:      Rate and Rhythm: Normal rate and regular rhythm.      Heart sounds: Normal heart sounds. No murmur heard.  Pulmonary:      Effort: Pulmonary effort is normal. No respiratory distress.      Breath sounds: Normal breath sounds. No wheezing or rales.   Abdominal:      General: There is no distension.      Palpations: Abdomen is soft.      Tenderness: There is no abdominal tenderness.   Musculoskeletal:      Cervical back: Normal range of motion and neck supple.   Lymphadenopathy:      Cervical: No cervical " adenopathy.   Skin:     General: Skin is warm and dry.   Neurological:      Mental Status: He is alert and oriented to person, place, and time.   Psychiatric:         Mood and Affect: Mood normal.         Behavior: Behavior normal.       Assessment/Plan:     1. Wellness examination  -     Comprehensive Metabolic Panel; Future; Expected date: 09/01/2024  -     Lipid Panel; Future; Expected date: 09/01/2024  -     CBC Auto Differential; Future; Expected date: 09/01/2024  -     Hemoglobin A1C; Future; Expected date: 09/01/2024  -     Urinalysis; Future; Expected date: 09/01/2024  -     PSA, Screening; Future; Expected date: 09/01/2024  Fasting labs due, ordered for next OV  2. Hyperlipidemia, unspecified hyperlipidemia type  -     Comprehensive Metabolic Panel; Future; Expected date: 09/01/2024  -     Lipid Panel; Future; Expected date: 09/01/2024  -     CBC Auto Differential; Future; Expected date: 09/01/2024  -     Hemoglobin A1C; Future; Expected date: 09/01/2024  -     Urinalysis; Future; Expected date: 09/01/2024  -     PSA, Screening; Future; Expected date: 09/01/2024  Stable continue low fat diet, work on LDL goal <100  3. Hypertension, unspecified type  -     Comprehensive Metabolic Panel; Future; Expected date: 09/01/2024  -     Lipid Panel; Future; Expected date: 09/01/2024  -     CBC Auto Differential; Future; Expected date: 09/01/2024  -     Hemoglobin A1C; Future; Expected date: 09/01/2024  -     Urinalysis; Future; Expected date: 09/01/2024  -     PSA, Screening; Future; Expected date: 09/01/2024  Stable ccm  4. Erectile dysfunction, unspecified erectile dysfunction type  Stable unchanged   5. History of transient ischemic attack  Stable work on LDL goal of <100  6. Vitamin D deficiency  Stable   7. Screening for malignant neoplasm of prostate  -     Comprehensive Metabolic Panel; Future; Expected date: 09/01/2024  -     Lipid Panel; Future; Expected date: 09/01/2024  -     CBC Auto Differential; Future;  Expected date: 09/01/2024  -     Hemoglobin A1C; Future; Expected date: 09/01/2024  -     Urinalysis; Future; Expected date: 09/01/2024  -     PSA, Screening; Future; Expected date: 09/01/2024    8. Elevated fasting glucose  -     Comprehensive Metabolic Panel; Future; Expected date: 09/01/2024  -     Lipid Panel; Future; Expected date: 09/01/2024  -     CBC Auto Differential; Future; Expected date: 09/01/2024  -     Hemoglobin A1C; Future; Expected date: 09/01/2024  -     Urinalysis; Future; Expected date: 09/01/2024  -     PSA, Screening; Future; Expected date: 09/01/2024       Follow up in about 6 months (around 10/1/2024) for Follow Up - Chronic Conditions.    This includes face to face time and non-face to face time preparing to see the patient (eg, review of tests), obtaining and/or reviewing separately obtained history, documenting clinical information in the electronic or other health record, independently interpreting results and communicating results to the patient/family/caregiver, or care coordinator.

## 2024-04-12 DIAGNOSIS — E78.5 HYPERLIPIDEMIA, UNSPECIFIED HYPERLIPIDEMIA TYPE: ICD-10-CM

## 2024-04-12 RX ORDER — EZETIMIBE 10 MG/1
10 TABLET ORAL
Qty: 90 TABLET | Refills: 3 | Status: SHIPPED | OUTPATIENT
Start: 2024-04-12

## 2024-08-28 DIAGNOSIS — I10 HYPERTENSION, UNSPECIFIED TYPE: ICD-10-CM

## 2024-08-29 RX ORDER — AMLODIPINE BESYLATE 5 MG/1
TABLET ORAL
Qty: 90 TABLET | Refills: 3 | Status: SHIPPED | OUTPATIENT
Start: 2024-08-29

## 2024-09-29 ENCOUNTER — LAB VISIT (OUTPATIENT)
Dept: LAB | Facility: HOSPITAL | Age: 54
End: 2024-09-29
Attending: INTERNAL MEDICINE
Payer: COMMERCIAL

## 2024-09-29 DIAGNOSIS — Z12.5 SCREENING FOR MALIGNANT NEOPLASM OF PROSTATE: ICD-10-CM

## 2024-09-29 DIAGNOSIS — R73.01 ELEVATED FASTING GLUCOSE: ICD-10-CM

## 2024-09-29 DIAGNOSIS — E78.5 HYPERLIPIDEMIA, UNSPECIFIED HYPERLIPIDEMIA TYPE: ICD-10-CM

## 2024-09-29 DIAGNOSIS — I10 HYPERTENSION, UNSPECIFIED TYPE: ICD-10-CM

## 2024-09-29 DIAGNOSIS — Z00.00 WELLNESS EXAMINATION: ICD-10-CM

## 2024-09-29 LAB
ALBUMIN SERPL-MCNC: 4 G/DL (ref 3.5–5)
ALBUMIN/GLOB SERPL: 1.4 RATIO (ref 1.1–2)
ALP SERPL-CCNC: 52 UNIT/L (ref 40–150)
ALT SERPL-CCNC: 22 UNIT/L (ref 0–55)
ANION GAP SERPL CALC-SCNC: 8 MEQ/L
AST SERPL-CCNC: 23 UNIT/L (ref 5–34)
BASOPHILS # BLD AUTO: 0.06 X10(3)/MCL
BASOPHILS NFR BLD AUTO: 1.2 %
BILIRUB SERPL-MCNC: 0.7 MG/DL
BILIRUB UR QL STRIP.AUTO: NEGATIVE
BUN SERPL-MCNC: 16.2 MG/DL (ref 8.4–25.7)
CALCIUM SERPL-MCNC: 9.9 MG/DL (ref 8.4–10.2)
CHLORIDE SERPL-SCNC: 108 MMOL/L (ref 98–107)
CHOLEST SERPL-MCNC: 218 MG/DL
CHOLEST/HDLC SERPL: 4 {RATIO} (ref 0–5)
CLARITY UR: CLEAR
CO2 SERPL-SCNC: 27 MMOL/L (ref 22–29)
COLOR UR AUTO: NORMAL
CREAT SERPL-MCNC: 1.25 MG/DL (ref 0.73–1.18)
CREAT/UREA NIT SERPL: 13
EOSINOPHIL # BLD AUTO: 0.2 X10(3)/MCL (ref 0–0.9)
EOSINOPHIL NFR BLD AUTO: 4 %
ERYTHROCYTE [DISTWIDTH] IN BLOOD BY AUTOMATED COUNT: 12.6 % (ref 11.5–17)
EST. AVERAGE GLUCOSE BLD GHB EST-MCNC: 108.3 MG/DL
GFR SERPLBLD CREATININE-BSD FMLA CKD-EPI: >60 ML/MIN/1.73/M2
GLOBULIN SER-MCNC: 2.8 GM/DL (ref 2.4–3.5)
GLUCOSE SERPL-MCNC: 85 MG/DL (ref 74–100)
GLUCOSE UR QL STRIP: NEGATIVE
HBA1C MFR BLD: 5.4 %
HCT VFR BLD AUTO: 41.4 % (ref 42–52)
HDLC SERPL-MCNC: 55 MG/DL (ref 35–60)
HGB BLD-MCNC: 13.8 G/DL (ref 14–18)
HGB UR QL STRIP: NEGATIVE
IMM GRANULOCYTES # BLD AUTO: 0 X10(3)/MCL (ref 0–0.04)
IMM GRANULOCYTES NFR BLD AUTO: 0 %
KETONES UR QL STRIP: NEGATIVE
LDLC SERPL CALC-MCNC: 147 MG/DL (ref 50–140)
LEUKOCYTE ESTERASE UR QL STRIP: NEGATIVE
LYMPHOCYTES # BLD AUTO: 2.7 X10(3)/MCL (ref 0.6–4.6)
LYMPHOCYTES NFR BLD AUTO: 54.5 %
MCH RBC QN AUTO: 30.5 PG (ref 27–31)
MCHC RBC AUTO-ENTMCNC: 33.3 G/DL (ref 33–36)
MCV RBC AUTO: 91.4 FL (ref 80–94)
MONOCYTES # BLD AUTO: 0.68 X10(3)/MCL (ref 0.1–1.3)
MONOCYTES NFR BLD AUTO: 13.7 %
NEUTROPHILS # BLD AUTO: 1.31 X10(3)/MCL (ref 2.1–9.2)
NEUTROPHILS NFR BLD AUTO: 26.6 %
NITRITE UR QL STRIP: NEGATIVE
NRBC BLD AUTO-RTO: 0 %
PH UR STRIP: 7 [PH]
PLATELET # BLD AUTO: 234 X10(3)/MCL (ref 130–400)
PMV BLD AUTO: 10.2 FL (ref 7.4–10.4)
POTASSIUM SERPL-SCNC: 4.5 MMOL/L (ref 3.5–5.1)
PROT SERPL-MCNC: 6.8 GM/DL (ref 6.4–8.3)
PROT UR QL STRIP: NEGATIVE
PSA SERPL-MCNC: 1.04 NG/ML
RBC # BLD AUTO: 4.53 X10(6)/MCL (ref 4.7–6.1)
SODIUM SERPL-SCNC: 143 MMOL/L (ref 136–145)
SP GR UR STRIP.AUTO: 1.02 (ref 1–1.03)
TRIGL SERPL-MCNC: 79 MG/DL (ref 34–140)
UROBILINOGEN UR STRIP-ACNC: 0.2
VLDLC SERPL CALC-MCNC: 16 MG/DL
WBC # BLD AUTO: 4.95 X10(3)/MCL (ref 4.5–11.5)

## 2024-09-29 PROCEDURE — 84153 ASSAY OF PSA TOTAL: CPT

## 2024-09-29 PROCEDURE — 85025 COMPLETE CBC W/AUTO DIFF WBC: CPT

## 2024-09-29 PROCEDURE — 80053 COMPREHEN METABOLIC PANEL: CPT

## 2024-09-29 PROCEDURE — 80061 LIPID PANEL: CPT

## 2024-09-29 PROCEDURE — 81003 URINALYSIS AUTO W/O SCOPE: CPT

## 2024-09-29 PROCEDURE — 83036 HEMOGLOBIN GLYCOSYLATED A1C: CPT

## 2024-09-29 PROCEDURE — 36415 COLL VENOUS BLD VENIPUNCTURE: CPT

## 2024-09-30 ENCOUNTER — OFFICE VISIT (OUTPATIENT)
Dept: FAMILY MEDICINE | Facility: CLINIC | Age: 54
End: 2024-09-30
Payer: COMMERCIAL

## 2024-09-30 VITALS
DIASTOLIC BLOOD PRESSURE: 84 MMHG | TEMPERATURE: 99 F | OXYGEN SATURATION: 98 % | WEIGHT: 194.5 LBS | HEART RATE: 54 BPM | RESPIRATION RATE: 16 BRPM | BODY MASS INDEX: 27.23 KG/M2 | SYSTOLIC BLOOD PRESSURE: 122 MMHG | HEIGHT: 71 IN

## 2024-09-30 DIAGNOSIS — E78.5 HYPERLIPIDEMIA, UNSPECIFIED HYPERLIPIDEMIA TYPE: ICD-10-CM

## 2024-09-30 DIAGNOSIS — Z00.00 WELLNESS EXAMINATION: ICD-10-CM

## 2024-09-30 DIAGNOSIS — R73.01 ELEVATED FASTING GLUCOSE: ICD-10-CM

## 2024-09-30 DIAGNOSIS — T46.6X5A MYALGIA DUE TO STATIN: Primary | ICD-10-CM

## 2024-09-30 DIAGNOSIS — Z12.5 SCREENING FOR MALIGNANT NEOPLASM OF PROSTATE: ICD-10-CM

## 2024-09-30 DIAGNOSIS — I10 HYPERTENSION, UNSPECIFIED TYPE: ICD-10-CM

## 2024-09-30 DIAGNOSIS — M79.10 MYALGIA DUE TO STATIN: Primary | ICD-10-CM

## 2024-09-30 DIAGNOSIS — Z86.73 HISTORY OF TRANSIENT ISCHEMIC ATTACK: ICD-10-CM

## 2024-09-30 PROCEDURE — 1159F MED LIST DOCD IN RCRD: CPT | Mod: CPTII,,, | Performed by: INTERNAL MEDICINE

## 2024-09-30 PROCEDURE — 3044F HG A1C LEVEL LT 7.0%: CPT | Mod: CPTII,,, | Performed by: INTERNAL MEDICINE

## 2024-09-30 PROCEDURE — 3074F SYST BP LT 130 MM HG: CPT | Mod: CPTII,,, | Performed by: INTERNAL MEDICINE

## 2024-09-30 PROCEDURE — 99214 OFFICE O/P EST MOD 30 MIN: CPT | Mod: ,,, | Performed by: INTERNAL MEDICINE

## 2024-09-30 PROCEDURE — 3079F DIAST BP 80-89 MM HG: CPT | Mod: CPTII,,, | Performed by: INTERNAL MEDICINE

## 2024-09-30 PROCEDURE — 3008F BODY MASS INDEX DOCD: CPT | Mod: CPTII,,, | Performed by: INTERNAL MEDICINE

## 2024-09-30 PROCEDURE — 1160F RVW MEDS BY RX/DR IN RCRD: CPT | Mod: CPTII,,, | Performed by: INTERNAL MEDICINE

## 2024-09-30 RX ORDER — EZETIMIBE 10 MG/1
10 TABLET ORAL DAILY
Qty: 90 TABLET | Refills: 3 | Status: SHIPPED | OUTPATIENT
Start: 2024-09-30

## 2024-09-30 NOTE — PROGRESS NOTES
Subjective:      Patient ID: Jeffrey Regan is a 54 y.o. male.    Chief Complaint: Hyperlipidemia    HPI  Follow up visit  Last wellness 2024    HTN:  current med: amlodipine 5 mg daily  BP controlled    The 10-year ASCVD risk score (Keith KERN, et al., 2019) is: 9.2%    Values used to calculate the score:      Age: 54 years      Sex: Male      Is Non- : Yes      Diabetic: No      Tobacco smoker: No      Systolic Blood Pressure: 122 mmHg      Is BP treated: Yes      HDL Cholesterol: 55 mg/dL      Total Cholesterol: 218 mg/dL      HLD:   LDL rising: Not taking Zetia 10 mg daily; did not tolerate statin secondary to myalgias  does supplement with: MV; Omega 3 capsule, Zinc, Vitamin D/C, aspirin    History of TIA:  Patient reports that in  and  he had two flare ups, one of neck pain along with numbness to R hand and second with R neck pain. Went to ED in 2017, was told possible TIA, work up including brain imaging, carotid artery US, calcium score, holter monitor reportedly normal. Then during second attack with his neck and chest pain, went to cardiology, Dr. Royal and angiogram performed reportedly normal as well. Was given medication during that time for HTN but was not told of HTN issues before. He says that he was tried on statin or zetia but did not tolerate side effects. He reports still having angina but can't tell if it's just a neck spasm to L neck now that he gets.    Vitamin D Deficiency; Takes Vitamin D3 daily. Unsure of dosage  ED: intermittent use of Viagra    Past Surgery History:  none      Family History:  Mom: DM, HTN,  in  CVA  Dad: DM with complications, PVD, emphysema,    Siblings: 8 in total  Sister: DM coma   Brother: Liver failure   Brother: heart condition  Brother: prostate cancer, lung cancer    COVID 19 vaccine: x 3 doses completed  Colonoscopy in 2019 with Dr. Carrillo  repeat due Dec 2024  Shingrix vaccine:  "education provided, not ready today  Health Maintenance Due   Topic Date Due    Shingles Vaccine (2 of 2) 05/11/2023    TETANUS VACCINE  01/14/2024    Influenza Vaccine (1) 09/01/2024    COVID-19 Vaccine (4 - 2024-25 season) 09/01/2024     Review of Systems   Cardiovascular:  Positive for palpitations.       Objective:     Vitals:    09/30/24 0931   BP: 122/84   BP Location: Left forearm   Patient Position: Sitting   Pulse: (!) 54   Resp: 16   Temp: 98.8 °F (37.1 °C)   TempSrc: Temporal   SpO2: 98%   Weight: 88.2 kg (194 lb 8 oz)   Height: 5' 11" (1.803 m)     Physical Exam  Vitals and nursing note reviewed.   Constitutional:       General: He is not in acute distress.     Appearance: Normal appearance. He is not ill-appearing.   HENT:      Head: Normocephalic and atraumatic.      Right Ear: There is impacted cerumen.      Left Ear: There is impacted cerumen.   Eyes:      Pupils: Pupils are equal, round, and reactive to light.   Cardiovascular:      Rate and Rhythm: Normal rate and regular rhythm.      Heart sounds: No murmur heard.  Pulmonary:      Effort: Pulmonary effort is normal. No respiratory distress.      Breath sounds: Normal breath sounds. No wheezing.   Abdominal:      General: Abdomen is flat. There is no distension.      Palpations: Abdomen is soft.      Tenderness: There is no abdominal tenderness.   Musculoskeletal:      Cervical back: Neck supple.      Right lower leg: No edema.      Left lower leg: No edema.   Lymphadenopathy:      Cervical: No cervical adenopathy.   Neurological:      Mental Status: He is alert.       Assessment/Plan:     1. Myalgia due to statin  Unable to tolerate statin  2. Hyperlipidemia, unspecified hyperlipidemia type  -     ezetimibe (ZETIA) 10 mg tablet; Take 1 tablet (10 mg total) by mouth once daily.  Dispense: 90 tablet; Refill: 3  Low fat reduced carb diet advised  Restart zetia as LDL did rise  3. Hypertension, unspecified type  Bp stable ccm  4. Wellness " examination    5. Screening for malignant neoplasm of prostate    6. Elevated fasting glucose    7. History of transient ischemic attack  Unable to tolerate statin, continue zetia     Follow up in about 6 months (around 3/30/2025) for Annual Wellness.    This includes face to face time and non-face to face time preparing to see the patient (eg, review of tests), obtaining and/or reviewing separately obtained history, documenting clinical information in the electronic or other health record, independently interpreting results and communicating results to the patient/family/caregiver, or care coordinator.

## 2024-12-16 ENCOUNTER — TELEPHONE (OUTPATIENT)
Dept: FAMILY MEDICINE | Facility: CLINIC | Age: 54
End: 2024-12-16
Payer: COMMERCIAL

## 2024-12-16 NOTE — TELEPHONE ENCOUNTER
----- Message from Dorie sent at 12/16/2024  3:24 PM CST -----  Who Called: Jeffrey Regan    Caller is requesting assistance/information from provider's office.    Symptoms (please be specific): n/a   How long has patient had these symptoms:  n/a  List of preferred pharmacies on file (remove unneeded): [id number] - [place] - [address] [[id number]]      Preferred Method of Contact: Phone Call  Patient's Preferred Phone Number on File: [phone]   Best Call Back Number, if different:  Additional Information: Pt requesting motion sickness medication(prefers patches) to be sent to pharmacy, going on cruise Friday 12/20/24. Please advise.

## 2024-12-17 RX ORDER — SCOLOPAMINE TRANSDERMAL SYSTEM 1 MG/1
1 PATCH, EXTENDED RELEASE TRANSDERMAL
Qty: 4 PATCH | Refills: 0 | Status: SHIPPED | OUTPATIENT
Start: 2024-12-17

## 2024-12-17 NOTE — TELEPHONE ENCOUNTER
I have signed for the following orders AND/OR meds. Please notify the patient and ask the patient to schedule the testing and/or information about any medications that were sent.      Medications Ordered This Encounter   Medications    scopolamine (TRANSDERM-SCOP) 1.3-1.5 mg (1 mg over 3 days)     Sig: Place 1 patch onto the skin every 72 hours.     Dispense:  4 patch     Refill:  0     No orders of the defined types were placed in this encounter.

## 2025-04-02 ENCOUNTER — LAB VISIT (OUTPATIENT)
Dept: LAB | Facility: HOSPITAL | Age: 55
End: 2025-04-02
Attending: INTERNAL MEDICINE
Payer: COMMERCIAL

## 2025-04-02 ENCOUNTER — RESULTS FOLLOW-UP (OUTPATIENT)
Dept: FAMILY MEDICINE | Facility: CLINIC | Age: 55
End: 2025-04-02

## 2025-04-02 ENCOUNTER — OFFICE VISIT (OUTPATIENT)
Dept: FAMILY MEDICINE | Facility: CLINIC | Age: 55
End: 2025-04-02
Payer: COMMERCIAL

## 2025-04-02 VITALS
SYSTOLIC BLOOD PRESSURE: 122 MMHG | BODY MASS INDEX: 27.55 KG/M2 | WEIGHT: 196.81 LBS | HEIGHT: 71 IN | RESPIRATION RATE: 12 BRPM | OXYGEN SATURATION: 99 % | HEART RATE: 77 BPM | DIASTOLIC BLOOD PRESSURE: 88 MMHG | TEMPERATURE: 98 F

## 2025-04-02 DIAGNOSIS — Z00.00 WELLNESS EXAMINATION: Primary | ICD-10-CM

## 2025-04-02 DIAGNOSIS — R73.01 ELEVATED FASTING GLUCOSE: ICD-10-CM

## 2025-04-02 DIAGNOSIS — E78.5 HYPERLIPIDEMIA, UNSPECIFIED HYPERLIPIDEMIA TYPE: ICD-10-CM

## 2025-04-02 DIAGNOSIS — Z00.00 WELLNESS EXAMINATION: ICD-10-CM

## 2025-04-02 DIAGNOSIS — I10 PRIMARY HYPERTENSION: ICD-10-CM

## 2025-04-02 DIAGNOSIS — E78.2 MIXED HYPERLIPIDEMIA: ICD-10-CM

## 2025-04-02 DIAGNOSIS — I10 HYPERTENSION, UNSPECIFIED TYPE: ICD-10-CM

## 2025-04-02 DIAGNOSIS — Z12.5 SCREENING FOR MALIGNANT NEOPLASM OF PROSTATE: ICD-10-CM

## 2025-04-02 DIAGNOSIS — R39.15 URINARY URGENCY: Primary | ICD-10-CM

## 2025-04-02 LAB
ALBUMIN SERPL-MCNC: 4.3 G/DL (ref 3.5–5)
ALBUMIN/GLOB SERPL: 1.4 RATIO (ref 1.1–2)
ALP SERPL-CCNC: 57 UNIT/L (ref 40–150)
ALT SERPL-CCNC: 23 UNIT/L (ref 0–55)
ANION GAP SERPL CALC-SCNC: 5 MEQ/L
AST SERPL-CCNC: 30 UNIT/L (ref 11–45)
BASOPHILS # BLD AUTO: 0.03 X10(3)/MCL
BASOPHILS NFR BLD AUTO: 0.6 %
BILIRUB SERPL-MCNC: 0.7 MG/DL
BILIRUB UR QL STRIP.AUTO: NEGATIVE
BUN SERPL-MCNC: 15.2 MG/DL (ref 8.4–25.7)
CALCIUM SERPL-MCNC: 9.5 MG/DL (ref 8.4–10.2)
CHLORIDE SERPL-SCNC: 107 MMOL/L (ref 98–107)
CHOLEST SERPL-MCNC: 175 MG/DL
CHOLEST/HDLC SERPL: 3 {RATIO} (ref 0–5)
CLARITY UR: CLEAR
CO2 SERPL-SCNC: 28 MMOL/L (ref 22–29)
COLOR UR AUTO: NORMAL
CREAT SERPL-MCNC: 1.16 MG/DL (ref 0.72–1.25)
CREAT/UREA NIT SERPL: 13
EOSINOPHIL # BLD AUTO: 0.36 X10(3)/MCL (ref 0–0.9)
EOSINOPHIL NFR BLD AUTO: 6.9 %
ERYTHROCYTE [DISTWIDTH] IN BLOOD BY AUTOMATED COUNT: 12.9 % (ref 11.5–17)
EST. AVERAGE GLUCOSE BLD GHB EST-MCNC: 114 MG/DL
GFR SERPLBLD CREATININE-BSD FMLA CKD-EPI: >60 ML/MIN/1.73/M2
GLOBULIN SER-MCNC: 3.1 GM/DL (ref 2.4–3.5)
GLUCOSE SERPL-MCNC: 91 MG/DL (ref 74–100)
GLUCOSE UR QL STRIP: NEGATIVE
HBA1C MFR BLD: 5.6 %
HCT VFR BLD AUTO: 42.6 % (ref 42–52)
HDLC SERPL-MCNC: 52 MG/DL (ref 35–60)
HGB BLD-MCNC: 14.4 G/DL (ref 14–18)
HGB UR QL STRIP: NEGATIVE
IMM GRANULOCYTES # BLD AUTO: 0.01 X10(3)/MCL (ref 0–0.04)
IMM GRANULOCYTES NFR BLD AUTO: 0.2 %
KETONES UR QL STRIP: NEGATIVE
LDLC SERPL CALC-MCNC: 111 MG/DL (ref 50–140)
LEUKOCYTE ESTERASE UR QL STRIP: NEGATIVE
LYMPHOCYTES # BLD AUTO: 2.82 X10(3)/MCL (ref 0.6–4.6)
LYMPHOCYTES NFR BLD AUTO: 53.9 %
MCH RBC QN AUTO: 30.8 PG (ref 27–31)
MCHC RBC AUTO-ENTMCNC: 33.8 G/DL (ref 33–36)
MCV RBC AUTO: 91 FL (ref 80–94)
MONOCYTES # BLD AUTO: 0.74 X10(3)/MCL (ref 0.1–1.3)
MONOCYTES NFR BLD AUTO: 14.1 %
NEUTROPHILS # BLD AUTO: 1.27 X10(3)/MCL (ref 2.1–9.2)
NEUTROPHILS NFR BLD AUTO: 24.3 %
NITRITE UR QL STRIP: NEGATIVE
NRBC BLD AUTO-RTO: 0 %
PH UR STRIP: 6 [PH]
PLATELET # BLD AUTO: 216 X10(3)/MCL (ref 130–400)
PMV BLD AUTO: 10.2 FL (ref 7.4–10.4)
POTASSIUM SERPL-SCNC: 4.3 MMOL/L (ref 3.5–5.1)
PROT SERPL-MCNC: 7.4 GM/DL (ref 6.4–8.3)
PROT UR QL STRIP: NEGATIVE
PSA SERPL-MCNC: 1.19 NG/ML
RBC # BLD AUTO: 4.68 X10(6)/MCL (ref 4.7–6.1)
SODIUM SERPL-SCNC: 140 MMOL/L (ref 136–145)
SP GR UR STRIP.AUTO: <=1.005 (ref 1–1.03)
TRIGL SERPL-MCNC: 62 MG/DL (ref 34–140)
UROBILINOGEN UR STRIP-ACNC: 0.2
VLDLC SERPL CALC-MCNC: 12 MG/DL
WBC # BLD AUTO: 5.23 X10(3)/MCL (ref 4.5–11.5)

## 2025-04-02 PROCEDURE — 81003 URINALYSIS AUTO W/O SCOPE: CPT

## 2025-04-02 PROCEDURE — 3044F HG A1C LEVEL LT 7.0%: CPT | Mod: CPTII,,, | Performed by: INTERNAL MEDICINE

## 2025-04-02 PROCEDURE — 80061 LIPID PANEL: CPT

## 2025-04-02 PROCEDURE — 99396 PREV VISIT EST AGE 40-64: CPT | Mod: ,,, | Performed by: INTERNAL MEDICINE

## 2025-04-02 PROCEDURE — 3074F SYST BP LT 130 MM HG: CPT | Mod: CPTII,,, | Performed by: INTERNAL MEDICINE

## 2025-04-02 PROCEDURE — 85025 COMPLETE CBC W/AUTO DIFF WBC: CPT

## 2025-04-02 PROCEDURE — 84153 ASSAY OF PSA TOTAL: CPT

## 2025-04-02 PROCEDURE — 1159F MED LIST DOCD IN RCRD: CPT | Mod: CPTII,,, | Performed by: INTERNAL MEDICINE

## 2025-04-02 PROCEDURE — 36415 COLL VENOUS BLD VENIPUNCTURE: CPT

## 2025-04-02 PROCEDURE — 1160F RVW MEDS BY RX/DR IN RCRD: CPT | Mod: CPTII,,, | Performed by: INTERNAL MEDICINE

## 2025-04-02 PROCEDURE — 3008F BODY MASS INDEX DOCD: CPT | Mod: CPTII,,, | Performed by: INTERNAL MEDICINE

## 2025-04-02 PROCEDURE — 3079F DIAST BP 80-89 MM HG: CPT | Mod: CPTII,,, | Performed by: INTERNAL MEDICINE

## 2025-04-02 PROCEDURE — 83036 HEMOGLOBIN GLYCOSYLATED A1C: CPT

## 2025-04-02 PROCEDURE — 80053 COMPREHEN METABOLIC PANEL: CPT

## 2025-04-02 RX ORDER — AMLODIPINE BESYLATE 5 MG/1
5 TABLET ORAL DAILY
Qty: 90 TABLET | Refills: 3 | Status: SHIPPED | OUTPATIENT
Start: 2025-04-02

## 2025-04-02 RX ORDER — EZETIMIBE 10 MG/1
10 TABLET ORAL DAILY
Qty: 90 TABLET | Refills: 3 | Status: SHIPPED | OUTPATIENT
Start: 2025-04-02

## 2025-04-02 NOTE — PROGRESS NOTES
Subjective:      Patient ID: Jeffrey Regan is a 55 y.o. male.    Chief Complaint: Annual Exam (Wellness Exam)    HPI    Wellness visit  Reports has some issues with staying asleep, sometimes will wake up around 12am or 1 am, cannot go back to sleep, but not always  Cannot correlate it to time he ate dinner, caffeine intake etc  Reports that he feels like he has more urgency symptoms with urination as well  Labs due, will order and call with results    HTN:  current med: amlodipine 5 mg daily  BP controlled         HLD:   LDL rising:  aking Zetia 10 mg daily; did not tolerate statin secondary to myalgias  does supplement with: MV; Omega 3 capsule, Zinc, Vitamin D/C, aspirin    History of TIA:  Patient reports that in  and  he had two flare ups, one of neck pain along with numbness to R hand and second with R neck pain. Went to ED in 2017, was told possible TIA, work up including brain imaging, carotid artery US, calcium score, holter monitor reportedly normal. Then during second attack with his neck and chest pain, went to cardiology, Dr. Royal and angiogram performed reportedly normal as well. Was given medication during that time for HTN but was not told of HTN issues before. He says that he was tried on statin or zetia but did not tolerate side effects. He reports still having angina but can't tell if it's just a neck spasm to L neck now that he gets.    Vitamin D Deficiency; Takes Vitamin D3 daily. Unsure of dosage  ED: intermittent use of Viagra    Past Surgery History:  none      Family History:  Mom: DM, HTN,  in  CVA  Dad: DM with complications, PVD, emphysema,    Siblings: 8 in total  Sister: DM coma   Brother: Liver failure   Brother: heart condition  Brother: prostate cancer, lung cancer    COVID 19 vaccine: x 3 doses completed  Colonoscopy in 2019 with Dr. Carrillo  repeat due Dec 2024  Shingrix vaccine: education provided, not ready today  Health Maintenance  "Due   Topic Date Due    Pneumococcal Vaccines (Age 50+) (1 of 1 - PCV) Never done    Shingles Vaccine (2 of 2) 05/11/2023    TETANUS VACCINE  01/14/2024    COVID-19 Vaccine (4 - 2024-25 season) 09/01/2024     Review of Systems   Constitutional:  Positive for fatigue. Negative for chills and fever.   HENT:  Negative for congestion, ear pain, postnasal drip, rhinorrhea, sinus pressure and sore throat.    Eyes:  Negative for itching and visual disturbance.   Respiratory:  Negative for cough, shortness of breath and wheezing.    Cardiovascular:  Negative for chest pain, palpitations and leg swelling.   Gastrointestinal:  Negative for abdominal pain and nausea.   Genitourinary:  Positive for urgency. Negative for dysuria.   Musculoskeletal:  Negative for arthralgias and myalgias.   Skin:  Negative for rash.   Neurological:  Negative for weakness, light-headedness and headaches.   Psychiatric/Behavioral:  Positive for sleep disturbance.        Objective:     Vitals:    04/02/25 0933   BP: 122/88   BP Location: Left arm   Patient Position: Sitting   Pulse: 77   Resp: 12   Temp: 97.5 °F (36.4 °C)   TempSrc: Temporal   SpO2: 99%   Weight: 89.3 kg (196 lb 12.8 oz)   Height: 5' 11" (1.803 m)     Physical Exam  Vitals and nursing note reviewed.   Constitutional:       General: He is not in acute distress.     Appearance: Normal appearance. He is not ill-appearing.   HENT:      Head: Normocephalic and atraumatic.   Eyes:      Pupils: Pupils are equal, round, and reactive to light.   Cardiovascular:      Rate and Rhythm: Normal rate and regular rhythm.   Pulmonary:      Effort: Pulmonary effort is normal. No respiratory distress.      Breath sounds: Normal breath sounds. No wheezing or rhonchi.   Abdominal:      General: Abdomen is flat. There is no distension.      Palpations: Abdomen is soft.      Tenderness: There is no abdominal tenderness. There is no guarding.   Musculoskeletal:      Cervical back: Neck supple.      Right " lower leg: No edema.      Left lower leg: No edema.   Lymphadenopathy:      Cervical: No cervical adenopathy.   Neurological:      Mental Status: He is alert.       Assessment/Plan:     1. Wellness examination  -     Comprehensive Metabolic Panel; Future; Expected date: 04/02/2025  -     Lipid Panel; Future; Expected date: 04/02/2025  -     CBC Auto Differential; Future; Expected date: 04/02/2025  -     Hemoglobin A1C; Future; Expected date: 04/02/2025  -     Urinalysis; Future; Expected date: 04/02/2025  -     PSA, Screening; Future; Expected date: 04/02/2025  Fasting labs ordered   2. Primary hypertension  -     Comprehensive Metabolic Panel; Future; Expected date: 04/02/2025  -     Lipid Panel; Future; Expected date: 04/02/2025  -     CBC Auto Differential; Future; Expected date: 04/02/2025  -     Hemoglobin A1C; Future; Expected date: 04/02/2025  -     Urinalysis; Future; Expected date: 04/02/2025  -     PSA, Screening; Future; Expected date: 04/02/2025  Stable BP ccm  3. Mixed hyperlipidemia  -     Comprehensive Metabolic Panel; Future; Expected date: 04/02/2025  -     Lipid Panel; Future; Expected date: 04/02/2025  -     CBC Auto Differential; Future; Expected date: 04/02/2025  -     Hemoglobin A1C; Future; Expected date: 04/02/2025  -     Urinalysis; Future; Expected date: 04/02/2025  -     PSA, Screening; Future; Expected date: 04/02/2025  Stable check lipid panel continue zetia and a low carb diet  4. Elevated fasting glucose  -     Comprehensive Metabolic Panel; Future; Expected date: 04/02/2025  -     Lipid Panel; Future; Expected date: 04/02/2025  -     CBC Auto Differential; Future; Expected date: 04/02/2025  -     Hemoglobin A1C; Future; Expected date: 04/02/2025  -     Urinalysis; Future; Expected date: 04/02/2025  -     PSA, Screening; Future; Expected date: 04/02/2025    5. Screening for malignant neoplasm of prostate  -     Comprehensive Metabolic Panel; Future; Expected date: 04/02/2025  -      Lipid Panel; Future; Expected date: 04/02/2025  -     CBC Auto Differential; Future; Expected date: 04/02/2025  -     Hemoglobin A1C; Future; Expected date: 04/02/2025  -     Urinalysis; Future; Expected date: 04/02/2025  -     PSA, Screening; Future; Expected date: 04/02/2025    6. Hypertension, unspecified type  -     amLODIPine (NORVASC) 5 MG tablet; Take 1 tablet (5 mg total) by mouth once daily.  Dispense: 90 tablet; Refill: 3  Stable ccm  7. Hyperlipidemia, unspecified hyperlipidemia type  -     ezetimibe (ZETIA) 10 mg tablet; Take 1 tablet (10 mg total) by mouth once daily.  Dispense: 90 tablet; Refill: 3       Follow up in about 6 months (around 10/2/2025) for Follow Up - Chronic Conditions.    I spent a total of 25 minutes on the day of the visit.This includes face to face time and non-face to face time preparing to see the patient (eg, review of tests), obtaining and/or reviewing separately obtained history, documenting clinical information in the electronic or other health record, independently interpreting results and communicating results to the patient/family/caregiver, or care coordinator.

## 2025-07-01 ENCOUNTER — PATIENT MESSAGE (OUTPATIENT)
Dept: FAMILY MEDICINE | Facility: CLINIC | Age: 55
End: 2025-07-01
Payer: COMMERCIAL

## 2025-07-02 ENCOUNTER — OFFICE VISIT (OUTPATIENT)
Dept: FAMILY MEDICINE | Facility: CLINIC | Age: 55
End: 2025-07-02
Payer: COMMERCIAL

## 2025-07-02 VITALS
SYSTOLIC BLOOD PRESSURE: 116 MMHG | TEMPERATURE: 97 F | DIASTOLIC BLOOD PRESSURE: 74 MMHG | RESPIRATION RATE: 19 BRPM | BODY MASS INDEX: 27.53 KG/M2 | HEIGHT: 71 IN | OXYGEN SATURATION: 98 % | WEIGHT: 196.63 LBS | HEART RATE: 84 BPM

## 2025-07-02 DIAGNOSIS — R51.9 TEMPORAL HEADACHE: Primary | ICD-10-CM

## 2025-07-02 PROCEDURE — 3008F BODY MASS INDEX DOCD: CPT | Mod: CPTII,,, | Performed by: NURSE PRACTITIONER

## 2025-07-02 PROCEDURE — 3074F SYST BP LT 130 MM HG: CPT | Mod: CPTII,,, | Performed by: NURSE PRACTITIONER

## 2025-07-02 PROCEDURE — 99213 OFFICE O/P EST LOW 20 MIN: CPT | Mod: ,,, | Performed by: NURSE PRACTITIONER

## 2025-07-02 PROCEDURE — 1160F RVW MEDS BY RX/DR IN RCRD: CPT | Mod: CPTII,,, | Performed by: NURSE PRACTITIONER

## 2025-07-02 PROCEDURE — 1159F MED LIST DOCD IN RCRD: CPT | Mod: CPTII,,, | Performed by: NURSE PRACTITIONER

## 2025-07-02 PROCEDURE — 3078F DIAST BP <80 MM HG: CPT | Mod: CPTII,,, | Performed by: NURSE PRACTITIONER

## 2025-07-02 PROCEDURE — 3044F HG A1C LEVEL LT 7.0%: CPT | Mod: CPTII,,, | Performed by: NURSE PRACTITIONER

## 2025-07-02 RX ORDER — BUTALBITAL, ACETAMINOPHEN AND CAFFEINE 50; 325; 40 MG/1; MG/1; MG/1
1 TABLET ORAL EVERY 6 HOURS PRN
Qty: 30 TABLET | Refills: 0 | Status: SHIPPED | OUTPATIENT
Start: 2025-07-02 | End: 2025-07-02

## 2025-07-02 RX ORDER — ASPIRIN 81 MG/1
81 TABLET ORAL ONCE
COMMUNITY
Start: 2025-04-04

## 2025-07-02 RX ORDER — BUTALBITAL, ACETAMINOPHEN AND CAFFEINE 50; 325; 40 MG/1; MG/1; MG/1
1 TABLET ORAL EVERY 6 HOURS PRN
Qty: 30 TABLET | Refills: 0 | Status: SHIPPED | OUTPATIENT
Start: 2025-07-02 | End: 2025-08-01

## 2025-07-02 NOTE — PROGRESS NOTES
Subjective:      Patient ID: Jeffrey Regan is a 55 y.o. Black or  male      Chief Complaint: Left sided head pressure       Past Medical History:   Diagnosis Date    Hyperlipidemia     Hypertension     TIA (transient ischemic attack)         HPI  History of Present Illness      Headaches  Mr. Regan reports pressure on the left side of his head, specifically in the left temporal area, for about 1.5 to 2 weeks. He describes the sensation as more of a pressure than a headache, with the current severity ranging from 2-3 out of 10, occasionally reaching up to 5 out of 10. The pressure is constant, occurring daily. He had similar symptoms years ago, when the sensation would travel inside his head, affecting different areas.    In the past, he was evaluated by his ENT, Dr. Salazar, who recommended a neurologist.  An MRI W Contrast was performed on 10/4/2018, which revealed a left temporal lobe venous angioma. After the initial occurrence, the symptoms subsided, with only occasional twitches thereafter.    He has a history of a TIA in 2016. During that episode, he had weakness while sitting at his desk, along with a twinge in his arm. He went to the ER, where a scan was performed but showed no abnormalities. As a result of this event, he now sees a cardiologist regularly, once or twice a year..    He denies visual disturbances, nausea, vomiting, ringing in the ear, dizziness, weakness, passing out, trouble with speech, one-sided weakness, one-sided numbness or tingling, and problems with coordination or walking.           Patient Care Team:  Cassandra Mix MD as PCP - General (Internal Medicine)  Cassandra Mix MD    Review of Systems   Constitutional:  Negative for chills, fatigue, fever and unexpected weight change.   HENT: Negative.     Eyes: Negative.    Respiratory: Negative.  Negative for shortness of breath.    Cardiovascular: Negative.  Negative for chest pain.   Gastrointestinal: Negative.     Endocrine: Negative.    Genitourinary: Negative.    Musculoskeletal: Negative.    Integumentary:  Negative.   Allergic/Immunologic: Negative.    Neurological:  Positive for headaches. Negative for weakness.   Hematological: Negative.    Psychiatric/Behavioral: Negative.     All other systems reviewed and are negative.        Objective:      Vitals:    07/02/25 0751   BP: 116/74   Pulse: 84   Resp: 19   Temp: 97.3 °F (36.3 °C)      Body mass index is 27.42 kg/m².     Physical Exam  Vitals reviewed.   Constitutional:       Appearance: He is not toxic-appearing.   HENT:      Head: Normocephalic.      Mouth/Throat:      Mouth: Mucous membranes are moist.   Eyes:      Extraocular Movements: Extraocular movements intact.      Pupils: Pupils are equal, round, and reactive to light.   Cardiovascular:      Rate and Rhythm: Normal rate and regular rhythm.      Pulses: Normal pulses.      Heart sounds: Normal heart sounds.   Pulmonary:      Effort: Pulmonary effort is normal. No respiratory distress.      Breath sounds: Normal breath sounds.   Abdominal:      General: Bowel sounds are normal. There is no distension.      Palpations: Abdomen is soft.      Tenderness: There is no abdominal tenderness.   Musculoskeletal:         General: No tenderness. Normal range of motion.      Cervical back: Neck supple.   Skin:     General: Skin is warm and dry.   Neurological:      Mental Status: He is alert and oriented to person, place, and time.      Cranial Nerves: Cranial nerves 2-12 are intact.      Sensory: Sensation is intact.      Motor: Motor function is intact.      Coordination: Coordination is intact. Romberg sign negative.      Gait: Gait is intact.   Psychiatric:         Mood and Affect: Mood normal.          Current Medications[1]    Assessment & Plan:   Assessment & Plan      IMPRESSION:  - Reviewed history of left temporal lobe venous angioma, a congenital malformation of brain veins, as shown in 10/4/2018 MRI.  -  Considered venous angioma as potential cause of recurring headaches, given its location in the left temporal area where symptoms occur.  - Performed neurological exam, which passed, indicating no acute neurological deficits.    CEREBRAL VENOUS ANGIOMA; HEADACHE:  -  Performed neurological exam; no acute neurological deficits.  - Evaluated the MRI from 10/4/2018 which showed a left temporal lobe venous angioma.  - Discussed potential link between venous angioma and recurring head pressure  - Referred the patient Neurology for further evaluation of the venous angioma and current symptoms, given the age of previous MRI.  -- Instructed the patient to contact the office if  he recalls the name of the previously seen neurologist to potentially change the referral.  - Informed the patient that the neurologist's office will call to schedule an appointment.  - Prescribed Fioricet as needed for symptomatic relief of headaches while awaiting neurology consultation.   - Instructed to continue to monitor symptoms and to report to ED for any weakness, dizziness, slurred speech, problems with coordination, syncope, CP, SOB, and/or worsening symptoms  - Pt is agreeable to plan and verbalizes understanding         Problem List Items Addressed This Visit    None  Visit Diagnoses         Temporal headache    -  Primary    Relevant Medications    butalbital-acetaminophen-caffeine -40 mg (FIORICET, ESGIC) -40 mg per tablet    Other Relevant Orders    Ambulatory referral/consult to Neurology              This note was generated with the assistance of ambient listening technology. Verbal consent was obtained by the patient and accompanying visitor(s) for the recording of patient appointment to facilitate this note. I attest to having reviewed and edited the generated note for accuracy, though some syntax or spelling errors may persist. Please contact the author of this note for any clarification.    Total time (25  minutes).This includes face to face time and non-face to face time preparing to see the patient (eg, review of tests), obtaining and/or reviewing separately obtained history, documenting clinical information in the electronic or other health record, independently interpreting results and communicating results to the patient/family/caregiver, or care coordinator.              [1]   Current Outpatient Medications:     amLODIPine (NORVASC) 5 MG tablet, Take 1 tablet (5 mg total) by mouth once daily., Disp: 90 tablet, Rfl: 3    aspirin (ECOTRIN) 81 MG EC tablet, Take 81 mg by mouth once., Disp: , Rfl:     ezetimibe (ZETIA) 10 mg tablet, Take 1 tablet (10 mg total) by mouth once daily., Disp: 90 tablet, Rfl: 3    butalbital-acetaminophen-caffeine -40 mg (FIORICET, ESGIC) -40 mg per tablet, Take 1 tablet by mouth every 6 (six) hours as needed for Headaches., Disp: 30 tablet, Rfl: 0

## 2025-07-11 NOTE — PROGRESS NOTES
Subjective     Patient ID: Jeffrey Regan is a 55 y.o. male.    Chief Complaint: temporal headache (New pt - temporal headache- referred by Jessica Lima NP)    HPI  Known L temporal venous angioma - looks like last imaged in 2018 (by ENT Dr. Salazar)  (Saw Preethi back then as well)    Began having daily HA a few weeks ago  L sided that would move posteriorly & over the crown of the head  The area was tender to touch  Had been taking ASA daily  He ran out 2 weeks ago, therefore stopped taking it & noticed that the daily HA was gone    Prior to this last episode, he'd only have a HA every few months  Would resolve with advil    Does not snore  Has sleep maint issues    H/o TIA in 2016  On amlodipine & zetia; intolerant to statins  Exercises regularly    Works for [employer]    Review of Systems  A 14pt ROS was reviewed & is negative unless otherwise documented in the HPI       Objective     Physical Exam  GENERAL: NAD, calm, cooperative, appropriate  Awake/alert  Well groomed  RESP: CTAB  HEART: RRR  No LE edema  MENTAL STATUS: oriented, follow commands reliably  SPEECH/LANGUAGE: clear, fluent  CN:  Perrla, eomi, vff, gaze conjugate  No tactile or motor facial asymmetry  Tongue protrudes midline  Motor: no focal weakness  Cerebellar: no tremor or dysmetria  Sensory: normal to tactile stim/vibration  DTRs: normal +2, symmetric  Gait: steady    RADIOLOGY:  MRI Brain w/wo 10/4/2018  1. Left temporal lobe venous angioma.   2. No acute intracranial findings identified.       Assessment and Plan     1. Episodic paroxysmal hemicrania, not intractable    2. Temporal headache  -     Ambulatory referral/consult to Neurology    Offered repeat MRI Brain w/wo - wants to defer for now  Unlikely that headache is r/t L temporal venous angioma  Discussed otc options for sleep  Avoid triptans given h/o TIA in 2016  Discussed MOH & advised him what to avoid  If HA become more frequent in the future, could consider gabapentin,  amitriptyline or topiramate.  F/u prn      Opal Benitez, AGACNP-BC     Total visit time of >65 min includes time spent preparing to see the patient (chart review), obtaining and/or reviewing separately obtained history, performing an exam, counseling/educating the patient and/or caregiver, ordering medications, tests or procedures, documenting clinical info in the EHR and care coordination.    Follow up if symptoms worsen or fail to improve.

## 2025-07-14 ENCOUNTER — OFFICE VISIT (OUTPATIENT)
Dept: NEUROLOGY | Facility: CLINIC | Age: 55
End: 2025-07-14
Payer: COMMERCIAL

## 2025-07-14 VITALS
BODY MASS INDEX: 27.72 KG/M2 | SYSTOLIC BLOOD PRESSURE: 136 MMHG | DIASTOLIC BLOOD PRESSURE: 85 MMHG | HEIGHT: 71 IN | WEIGHT: 198 LBS

## 2025-07-14 DIAGNOSIS — G44.039 EPISODIC PAROXYSMAL HEMICRANIA, NOT INTRACTABLE: Primary | ICD-10-CM

## 2025-07-14 DIAGNOSIS — R51.9 TEMPORAL HEADACHE: ICD-10-CM

## 2025-07-14 PROCEDURE — 3008F BODY MASS INDEX DOCD: CPT | Mod: CPTII,S$GLB,, | Performed by: NURSE PRACTITIONER

## 2025-07-14 PROCEDURE — 99999 PR PBB SHADOW E&M-EST. PATIENT-LVL III: CPT | Mod: PBBFAC,,, | Performed by: NURSE PRACTITIONER

## 2025-07-14 PROCEDURE — 3079F DIAST BP 80-89 MM HG: CPT | Mod: CPTII,S$GLB,, | Performed by: NURSE PRACTITIONER

## 2025-07-14 PROCEDURE — 1160F RVW MEDS BY RX/DR IN RCRD: CPT | Mod: CPTII,S$GLB,, | Performed by: NURSE PRACTITIONER

## 2025-07-14 PROCEDURE — 99205 OFFICE O/P NEW HI 60 MIN: CPT | Mod: S$GLB,,, | Performed by: NURSE PRACTITIONER

## 2025-07-14 PROCEDURE — 3075F SYST BP GE 130 - 139MM HG: CPT | Mod: CPTII,S$GLB,, | Performed by: NURSE PRACTITIONER

## 2025-07-14 PROCEDURE — 3044F HG A1C LEVEL LT 7.0%: CPT | Mod: CPTII,S$GLB,, | Performed by: NURSE PRACTITIONER

## 2025-07-14 PROCEDURE — 1159F MED LIST DOCD IN RCRD: CPT | Mod: CPTII,S$GLB,, | Performed by: NURSE PRACTITIONER
